# Patient Record
Sex: MALE | Race: WHITE | NOT HISPANIC OR LATINO | Employment: OTHER | URBAN - METROPOLITAN AREA
[De-identification: names, ages, dates, MRNs, and addresses within clinical notes are randomized per-mention and may not be internally consistent; named-entity substitution may affect disease eponyms.]

---

## 2023-02-13 PROBLEM — I35.1 MILD AORTIC INSUFFICIENCY: Status: ACTIVE | Noted: 2023-02-13

## 2023-02-13 PROBLEM — M54.50 RIGHT LOW BACK PAIN: Status: ACTIVE | Noted: 2023-02-13

## 2023-02-13 PROBLEM — R60.0 BILATERAL LEG EDEMA: Status: ACTIVE | Noted: 2023-02-13

## 2023-02-13 PROBLEM — R31.9 HEMATURIA: Status: RESOLVED | Noted: 2023-02-13 | Resolved: 2023-02-13

## 2023-02-13 PROBLEM — M19.90 OSTEOARTHRITIS, LOCALIZED: Status: ACTIVE | Noted: 2023-02-13

## 2023-02-13 PROBLEM — M15.9 GENERALIZED OSTEOARTHRITIS OF MULTIPLE SITES: Status: ACTIVE | Noted: 2023-02-13

## 2023-02-13 PROBLEM — L53.9 LEG ERYTHEMA: Status: ACTIVE | Noted: 2023-02-13

## 2023-02-13 PROBLEM — I12.0 BENIGN HYPERTENSIVE KIDNEY DISEASE, STAGE V (MULTI): Status: ACTIVE | Noted: 2023-02-13

## 2023-02-13 PROBLEM — N40.0 BENIGN ENLARGEMENT OF PROSTATE: Status: ACTIVE | Noted: 2023-02-13

## 2023-02-13 PROBLEM — L55.1 SECOND DEGREE SUNBURN: Status: RESOLVED | Noted: 2023-02-13 | Resolved: 2023-02-13

## 2023-02-13 PROBLEM — R60.9 EDEMA: Status: ACTIVE | Noted: 2023-02-13

## 2023-02-13 PROBLEM — M79.652 LEFT THIGH PAIN: Status: RESOLVED | Noted: 2023-02-13 | Resolved: 2023-02-13

## 2023-02-13 PROBLEM — L08.9 INFECTED SEBACEOUS CYST: Status: ACTIVE | Noted: 2023-02-13

## 2023-02-13 PROBLEM — R21 RASH: Status: RESOLVED | Noted: 2023-02-13 | Resolved: 2023-02-13

## 2023-02-13 PROBLEM — C61 ADENOCARCINOMA OF PROSTATE (MULTI): Status: ACTIVE | Noted: 2023-02-13

## 2023-02-13 PROBLEM — R07.81 RIB PAIN ON LEFT SIDE: Status: RESOLVED | Noted: 2023-02-13 | Resolved: 2023-02-13

## 2023-02-13 PROBLEM — I42.2 ASYMMETRIC SEPTAL HYPERTROPHY (MULTI): Status: ACTIVE | Noted: 2023-02-13

## 2023-02-13 PROBLEM — E55.9 VITAMIN D DEFICIENCY: Status: ACTIVE | Noted: 2023-02-13

## 2023-02-13 PROBLEM — R05.8 COUGH SECONDARY TO ANGIOTENSIN CONVERTING ENZYME INHIBITOR (ACE-I): Status: ACTIVE | Noted: 2023-02-13

## 2023-02-13 PROBLEM — L72.3 INFECTED SEBACEOUS CYST: Status: ACTIVE | Noted: 2023-02-13

## 2023-02-13 PROBLEM — E87.6 HYPOKALEMIA: Status: ACTIVE | Noted: 2023-02-13

## 2023-02-13 PROBLEM — R05.9 COUGH: Status: RESOLVED | Noted: 2023-02-13 | Resolved: 2023-02-13

## 2023-02-13 PROBLEM — K21.9 ESOPHAGEAL REFLUX: Status: ACTIVE | Noted: 2023-02-13

## 2023-02-13 PROBLEM — T46.4X5A COUGH SECONDARY TO ANGIOTENSIN CONVERTING ENZYME INHIBITOR (ACE-I): Status: ACTIVE | Noted: 2023-02-13

## 2023-02-13 PROBLEM — M54.2 NECK PAIN: Status: RESOLVED | Noted: 2023-02-13 | Resolved: 2023-02-13

## 2023-02-13 PROBLEM — L30.9 ECZEMA: Status: ACTIVE | Noted: 2023-02-13

## 2023-02-13 PROBLEM — I48.91 ATRIAL FIBRILLATION (MULTI): Status: ACTIVE | Noted: 2023-02-13

## 2023-02-13 PROBLEM — R29.898 BILATERAL LEG WEAKNESS: Status: ACTIVE | Noted: 2023-02-13

## 2023-02-13 PROBLEM — M25.551 RIGHT HIP PAIN: Status: RESOLVED | Noted: 2023-02-13 | Resolved: 2023-02-13

## 2023-02-13 PROBLEM — R29.898 TRANSIENT RIGHT LEG WEAKNESS: Status: ACTIVE | Noted: 2023-02-13

## 2023-02-13 PROBLEM — J20.9 ACUTE BRONCHITIS: Status: RESOLVED | Noted: 2023-02-13 | Resolved: 2023-02-13

## 2023-02-13 PROBLEM — E53.8 VITAMIN B 12 DEFICIENCY: Status: ACTIVE | Noted: 2023-02-13

## 2023-02-13 PROBLEM — N18.5 BENIGN HYPERTENSIVE KIDNEY DISEASE, STAGE V (MULTI): Status: ACTIVE | Noted: 2023-02-13

## 2023-02-13 PROBLEM — K44.9 HIATAL HERNIA: Status: ACTIVE | Noted: 2023-02-13

## 2023-02-13 PROBLEM — I51.7 ASYMMETRIC SEPTAL HYPERTROPHY: Status: ACTIVE | Noted: 2023-02-13

## 2023-02-13 PROBLEM — M96.1 FAILED BACK SYNDROME OF LUMBAR SPINE: Status: ACTIVE | Noted: 2023-02-13

## 2023-02-13 PROBLEM — I10 HYPERTENSION: Status: ACTIVE | Noted: 2023-02-13

## 2023-02-13 PROBLEM — R91.1 LUNG NODULE: Status: ACTIVE | Noted: 2023-02-13

## 2023-02-13 PROBLEM — R13.10 DYSPHAGIA: Status: ACTIVE | Noted: 2023-02-13

## 2023-02-13 PROBLEM — T24.019A BURN OF THIGH: Status: RESOLVED | Noted: 2023-02-13 | Resolved: 2023-02-13

## 2023-02-13 PROBLEM — E78.00 HYPERCHOLESTEROLEMIA: Status: ACTIVE | Noted: 2023-02-13

## 2023-02-13 RX ORDER — FUROSEMIDE 20 MG/1
30 TABLET ORAL DAILY
COMMUNITY
Start: 2021-03-17 | End: 2023-06-05 | Stop reason: DRUGHIGH

## 2023-02-13 RX ORDER — METOPROLOL SUCCINATE 25 MG/1
1 TABLET, EXTENDED RELEASE ORAL DAILY
COMMUNITY
Start: 2022-03-28 | End: 2023-11-03 | Stop reason: SDUPTHER

## 2023-02-13 RX ORDER — SIMVASTATIN 40 MG/1
1 TABLET, FILM COATED ORAL NIGHTLY
COMMUNITY
Start: 2012-07-06 | End: 2023-03-16 | Stop reason: SINTOL

## 2023-02-13 RX ORDER — DOXAZOSIN 2 MG/1
2 TABLET ORAL NIGHTLY
COMMUNITY
Start: 2021-07-25 | End: 2023-06-05 | Stop reason: ALTCHOICE

## 2023-02-13 RX ORDER — TAMSULOSIN HYDROCHLORIDE 0.4 MG/1
1 CAPSULE ORAL DAILY
COMMUNITY
Start: 2016-07-06 | End: 2023-06-05 | Stop reason: ALTCHOICE

## 2023-02-13 RX ORDER — NAPROXEN SODIUM 220 MG/1
1200 TABLET ORAL DAILY
COMMUNITY
Start: 2019-02-14

## 2023-03-16 ENCOUNTER — LAB (OUTPATIENT)
Dept: LAB | Facility: LAB | Age: 83
End: 2023-03-16
Payer: MEDICARE

## 2023-03-16 ENCOUNTER — OFFICE VISIT (OUTPATIENT)
Dept: PRIMARY CARE | Facility: CLINIC | Age: 83
End: 2023-03-16
Payer: MEDICARE

## 2023-03-16 VITALS
SYSTOLIC BLOOD PRESSURE: 92 MMHG | HEART RATE: 87 BPM | RESPIRATION RATE: 20 BRPM | DIASTOLIC BLOOD PRESSURE: 54 MMHG | TEMPERATURE: 98 F | OXYGEN SATURATION: 98 %

## 2023-03-16 DIAGNOSIS — R60.0 EDEMA OF RIGHT LOWER EXTREMITY: ICD-10-CM

## 2023-03-16 DIAGNOSIS — S81.802A WOUND OF LEFT LOWER EXTREMITY, INITIAL ENCOUNTER: ICD-10-CM

## 2023-03-16 DIAGNOSIS — E78.00 HYPERCHOLESTEROLEMIA: ICD-10-CM

## 2023-03-16 DIAGNOSIS — E87.6 HYPOKALEMIA: ICD-10-CM

## 2023-03-16 DIAGNOSIS — R29.898 BILATERAL LEG WEAKNESS: ICD-10-CM

## 2023-03-16 DIAGNOSIS — M96.1 FAILED BACK SYNDROME OF LUMBAR SPINE: ICD-10-CM

## 2023-03-16 DIAGNOSIS — R29.898 TRANSIENT RIGHT LEG WEAKNESS: ICD-10-CM

## 2023-03-16 DIAGNOSIS — I10 HYPERTENSION, UNSPECIFIED TYPE: ICD-10-CM

## 2023-03-16 DIAGNOSIS — I10 HYPERTENSION, UNSPECIFIED TYPE: Primary | ICD-10-CM

## 2023-03-16 LAB
ALANINE AMINOTRANSFERASE (SGPT) (U/L) IN SER/PLAS: 20 U/L (ref 10–52)
ALBUMIN (G/DL) IN SER/PLAS: 3.5 G/DL (ref 3.4–5)
ALKALINE PHOSPHATASE (U/L) IN SER/PLAS: 48 U/L (ref 33–136)
ANION GAP IN SER/PLAS: 14 MMOL/L (ref 10–20)
ASPARTATE AMINOTRANSFERASE (SGOT) (U/L) IN SER/PLAS: 28 U/L (ref 9–39)
BILIRUBIN TOTAL (MG/DL) IN SER/PLAS: 1.2 MG/DL (ref 0–1.2)
CALCIUM (MG/DL) IN SER/PLAS: 8.5 MG/DL (ref 8.6–10.3)
CARBON DIOXIDE, TOTAL (MMOL/L) IN SER/PLAS: 23 MMOL/L (ref 21–32)
CHLORIDE (MMOL/L) IN SER/PLAS: 104 MMOL/L (ref 98–107)
CREATININE (MG/DL) IN SER/PLAS: 2.11 MG/DL (ref 0.5–1.3)
ERYTHROCYTE DISTRIBUTION WIDTH (RATIO) BY AUTOMATED COUNT: 14.1 % (ref 11.5–14.5)
ERYTHROCYTE MEAN CORPUSCULAR HEMOGLOBIN CONCENTRATION (G/DL) BY AUTOMATED: 33.2 G/DL (ref 32–36)
ERYTHROCYTE MEAN CORPUSCULAR VOLUME (FL) BY AUTOMATED COUNT: 91 FL (ref 80–100)
ERYTHROCYTES (10*6/UL) IN BLOOD BY AUTOMATED COUNT: 3.16 X10E12/L (ref 4.5–5.9)
GFR MALE: 30 ML/MIN/1.73M2
GLUCOSE (MG/DL) IN SER/PLAS: 104 MG/DL (ref 74–99)
HEMATOCRIT (%) IN BLOOD BY AUTOMATED COUNT: 28.9 % (ref 41–52)
HEMOGLOBIN (G/DL) IN BLOOD: 9.6 G/DL (ref 13.5–17.5)
LEUKOCYTES (10*3/UL) IN BLOOD BY AUTOMATED COUNT: 5.5 X10E9/L (ref 4.4–11.3)
PLATELETS (10*3/UL) IN BLOOD AUTOMATED COUNT: 162 X10E9/L (ref 150–450)
POTASSIUM (MMOL/L) IN SER/PLAS: 3.4 MMOL/L (ref 3.5–5.3)
PROTEIN TOTAL: 5.8 G/DL (ref 6.4–8.2)
SODIUM (MMOL/L) IN SER/PLAS: 138 MMOL/L (ref 136–145)
UREA NITROGEN (MG/DL) IN SER/PLAS: 56 MG/DL (ref 6–23)

## 2023-03-16 PROCEDURE — 85027 COMPLETE CBC AUTOMATED: CPT

## 2023-03-16 PROCEDURE — 80053 COMPREHEN METABOLIC PANEL: CPT

## 2023-03-16 PROCEDURE — 99215 OFFICE O/P EST HI 40 MIN: CPT | Performed by: FAMILY MEDICINE

## 2023-03-16 PROCEDURE — 36415 COLL VENOUS BLD VENIPUNCTURE: CPT

## 2023-03-16 RX ORDER — TRAMADOL HYDROCHLORIDE 50 MG/1
50 TABLET ORAL EVERY 8 HOURS PRN
Qty: 18 TABLET | Refills: 0 | Status: SHIPPED | OUTPATIENT
Start: 2023-03-16 | End: 2023-03-22

## 2023-03-16 NOTE — PROGRESS NOTES
Subjective   Patient ID: Jero Aguilar is a 82 y.o. male who presents for Leg Problem (Constant pain in the right thigh. Swelling has improved but pain is becoming more severe. The extreme pain come at night. Cannot lift it by himself. Is dragging it when walking. s).    HPI   Patient presents today with his wife for follow-up on the swelling of the right lower extremity.  He has been using the furosemide on an as-needed basis now and he usually does not use it on days when he is leaving the house.  His wife also noticed an ulcer on the lateral side of his left lower extremity.  It is a shallow ulcer and approximately a centimeter in diameter.  The left leg is not as swollen as the right.  Patient is also complaining of significant discomfort in the right thigh and significant loss of function of the right lower extremity.  He cannot lift it on his own.  Review of Systems   All other systems reviewed and are negative.      Objective   BP 92/54   Pulse 87   Temp 36.7 °C (98 °F)   Resp 20   SpO2 98%     Physical Exam  Constitutional:       Appearance: He is well-developed.   HENT:      Head: Normocephalic and atraumatic.      Right Ear: Tympanic membrane, ear canal and external ear normal.      Left Ear: Tympanic membrane, ear canal and external ear normal.      Ears:      Comments: Patient has bilateral hearing loss and wears hearing aids     Nose: Nose normal.      Mouth/Throat:      Mouth: Mucous membranes are moist.      Pharynx: Oropharynx is clear.   Eyes:      Extraocular Movements: Extraocular movements intact.      Conjunctiva/sclera: Conjunctivae normal.      Pupils: Pupils are equal, round, and reactive to light.   Cardiovascular:      Rate and Rhythm: Normal rate and regular rhythm.      Heart sounds: Normal heart sounds. No murmur heard.  Pulmonary:      Effort: Pulmonary effort is normal.      Breath sounds: Normal breath sounds. No wheezing.   Abdominal:      General: Abdomen is flat. Bowel sounds  are normal.      Palpations: Abdomen is soft.   Musculoskeletal:         General: Swelling (2-3+ pitting edema of the right lower extremity) present. Normal range of motion.   Skin:     General: Skin is warm and dry.      Findings: Lesion (1 cm in diameter shallow ulcer on the lateral left lower extremity) present.   Neurological:      General: No focal deficit present.      Mental Status: He is alert and oriented to person, place, and time. Mental status is at baseline.      Motor: Weakness (Patient with significant motor weakness of the right lower extremity) present.   Psychiatric:         Mood and Affect: Mood normal.         Behavior: Behavior normal.         Thought Content: Thought content normal.         Judgment: Judgment normal.         Assessment/Plan   Problem List Items Addressed This Visit       Hypertension - Primary    Relevant Orders    CBC (Completed)    Comprehensive Metabolic Panel (Completed)    Failed back syndrome of lumbar spine    Relevant Medications    traMADol (Ultram) 50 mg tablet    Hypercholesterolemia    Hypokalemia    Relevant Orders    Comprehensive Metabolic Panel (Completed)    Bilateral leg weakness    Relevant Orders    CBC (Completed)    Transient right leg weakness     Other Visit Diagnoses       Edema of right lower extremity        Relevant Orders    Vascular US lower extremity venous duplex right (Completed)    Wound of left lower extremity, initial encounter        Relevant Orders    Referral to Wound Clinic        Referral to wound clinic for the lesion on the lateral left lower extremity.  Will contact patient with lab results when available.  Because of the pain he is having in the right lower extremity today will do a venous duplex scan of the right lower extremity to rule out a clot.  He is already taking Xarelto 15 mg daily.  Medication list reconciled.  Thank you for visiting today!      Professional services: Some of this note was completed using Dragon voice  technology and sometimes the software misinterprets words. This may include unintended errors with respect to translation of words, typographical errors or grammar errors which may not have been identified prior to finalization of the chart note. Please take this into account when reading the note. Thank you.

## 2023-03-28 ENCOUNTER — TELEPHONE (OUTPATIENT)
Dept: PRIMARY CARE | Facility: CLINIC | Age: 83
End: 2023-03-28
Payer: MEDICARE

## 2023-03-28 NOTE — TELEPHONE ENCOUNTER
Mena from [facility] calling for verbal orders to initiate  home care for PT and OT. Please advise.

## 2023-04-03 ENCOUNTER — APPOINTMENT (OUTPATIENT)
Dept: PRIMARY CARE | Facility: CLINIC | Age: 83
End: 2023-04-03
Payer: MEDICARE

## 2023-04-03 ENCOUNTER — OFFICE VISIT (OUTPATIENT)
Dept: PRIMARY CARE | Facility: CLINIC | Age: 83
End: 2023-04-03
Payer: MEDICARE

## 2023-04-03 VITALS
HEART RATE: 82 BPM | BODY MASS INDEX: 24.18 KG/M2 | DIASTOLIC BLOOD PRESSURE: 56 MMHG | WEIGHT: 159 LBS | RESPIRATION RATE: 18 BRPM | SYSTOLIC BLOOD PRESSURE: 89 MMHG | OXYGEN SATURATION: 98 % | TEMPERATURE: 98.4 F

## 2023-04-03 DIAGNOSIS — R60.0 BILATERAL LEG EDEMA: ICD-10-CM

## 2023-04-03 DIAGNOSIS — R29.898 BILATERAL LEG WEAKNESS: ICD-10-CM

## 2023-04-03 DIAGNOSIS — F03.90 DEMENTIA, UNSPECIFIED DEMENTIA SEVERITY, UNSPECIFIED DEMENTIA TYPE, UNSPECIFIED WHETHER BEHAVIORAL, PSYCHOTIC, OR MOOD DISTURBANCE OR ANXIETY (MULTI): ICD-10-CM

## 2023-04-03 DIAGNOSIS — K29.80 DUODENITIS: Primary | ICD-10-CM

## 2023-04-03 DIAGNOSIS — I10 HYPERTENSION, UNSPECIFIED TYPE: ICD-10-CM

## 2023-04-03 DIAGNOSIS — K55.21 AVM (ARTERIOVENOUS MALFORMATION) OF COLON WITH HEMORRHAGE: ICD-10-CM

## 2023-04-03 DIAGNOSIS — I48.11 LONGSTANDING PERSISTENT ATRIAL FIBRILLATION (MULTI): ICD-10-CM

## 2023-04-03 PROCEDURE — 99495 TRANSJ CARE MGMT MOD F2F 14D: CPT | Performed by: FAMILY MEDICINE

## 2023-04-03 NOTE — PROGRESS NOTES
Subjective   Patient ID: Jero Aguilar is a 82 y.o. male who presents for Hospital Follow-up (For low hemoglobin. ).    HPI   Patient comes in today with his wife for follow-up on recent hospitalization.  Patient was seen here on 3/16/2023 and lab work was done and he was found to be quite anemic.  He was notified of the anemia and recommended he go to the ER for further evaluation and work-up.  He was seen at [facility] and his FOBT was positive in the ED.  Patient was admitted and an EGD and colonoscopy were done.  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  His hemoglobin was stable he did receive 1 dose of Venofer.  His Xarelto was discontinued in the hospital and he was instructed to restart it again for a week or 2.  He has an upcoming appointment with his cardiologist in 2 weeks.  He has chronic atrial fib.  Upon discharge from the hospital he was sent to a nursing home where he stayed for a couple of days before his wife brought him home AMA.  She states that since he has been home he has not complained about leg or back pain.  Also the swelling in his legs has significantly decreased.    3/16/2023  Patient presents today with his wife for follow-up on the swelling of the right lower extremity.  He has been using the furosemide on an as-needed basis now and he usually does not use it on days when he is leaving the house.  His wife also noticed an ulcer on the lateral side of his left lower extremity.  It is a shallow ulcer and approximately a centimeter in diameter.  The left leg is not as swollen as the right.  Patient is also complaining of significant discomfort in the right thigh and significant loss of function of the right lower extremity.  He cannot lift it on his own.    Review of Systems   All other systems reviewed and are negative.      Objective   BP 89/56   Pulse 82   Temp 36.9 °C (98.4 °F)   Resp 18   Wt 72.1 kg  (159 lb)   SpO2 98%   BMI 24.18 kg/m²     Physical Exam  Vitals reviewed.   Constitutional:       Appearance: He is well-developed.      Comments: Patient is wheelchair-bound   HENT:      Head: Normocephalic and atraumatic.      Right Ear: Tympanic membrane, ear canal and external ear normal.      Left Ear: Tympanic membrane, ear canal and external ear normal.      Nose: Nose normal.      Mouth/Throat:      Mouth: Mucous membranes are moist.      Pharynx: Oropharynx is clear.   Eyes:      Extraocular Movements: Extraocular movements intact.      Conjunctiva/sclera: Conjunctivae normal.      Pupils: Pupils are equal, round, and reactive to light.   Cardiovascular:      Rate and Rhythm: Normal rate and regular rhythm.      Heart sounds: Normal heart sounds. No murmur heard.  Pulmonary:      Effort: Pulmonary effort is normal.      Breath sounds: Normal breath sounds. No wheezing.   Abdominal:      General: Abdomen is flat. Bowel sounds are normal.      Palpations: Abdomen is soft.   Musculoskeletal:         General: Normal range of motion.      Comments: Patient is using wheelchair at this time   Skin:     General: Skin is warm and dry.   Neurological:      General: No focal deficit present.      Mental Status: He is alert and oriented to person, place, and time. Mental status is at baseline.   Psychiatric:         Mood and Affect: Mood normal.         Behavior: Behavior normal.      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  RTC in one month for recheck, sooner should problems arise.  Return to clinic in the near future for complete physical exam and fasting labs.  Medication list reconciled.  Thank you for  visiting today!      Professional services: Some of this note was completed using Dragon voice technology and sometimes the software misinterprets words. This may include unintended errors with respect to translation of words, typographical errors or grammar errors which may not have been identified prior to finalization of the chart note. Please take this into account when reading the note. Thank you.

## 2023-04-05 PROBLEM — F03.90 DEMENTIA (MULTI): Status: ACTIVE | Noted: 2023-04-05

## 2023-04-05 PROBLEM — K29.80 DUODENITIS: Status: ACTIVE | Noted: 2023-04-05

## 2023-04-05 PROBLEM — K55.21 AVM (ARTERIOVENOUS MALFORMATION) OF COLON WITH HEMORRHAGE: Status: ACTIVE | Noted: 2023-04-05

## 2023-04-17 ENCOUNTER — TELEPHONE (OUTPATIENT)
Dept: PRIMARY CARE | Facility: CLINIC | Age: 83
End: 2023-04-17
Payer: MEDICARE

## 2023-04-17 DIAGNOSIS — K21.9 GASTROESOPHAGEAL REFLUX DISEASE WITHOUT ESOPHAGITIS: Primary | ICD-10-CM

## 2023-04-17 RX ORDER — PANTOPRAZOLE SODIUM 40 MG/1
40 TABLET, DELAYED RELEASE ORAL DAILY
Qty: 30 TABLET | Refills: 1 | Status: SHIPPED | OUTPATIENT
Start: 2023-04-17 | End: 2023-06-21 | Stop reason: SDUPTHER

## 2023-04-17 NOTE — TELEPHONE ENCOUNTER
Patient's wife is calling because when he was in the hospital a couple weeks ago he was put on Pantoproazole Sodium 40 mg she is wondering does he still need to take this medication and if so can you call in a refill to the [facility] in [place].  Patient can be reached at [phone].    Thank You

## 2023-06-02 DIAGNOSIS — E78.00 HYPERCHOLESTEROLEMIA: Primary | ICD-10-CM

## 2023-06-02 RX ORDER — SIMVASTATIN 40 MG/1
TABLET, FILM COATED ORAL
Qty: 90 TABLET | Refills: 0 | Status: SHIPPED | OUTPATIENT
Start: 2023-06-02 | End: 2023-06-05 | Stop reason: ALTCHOICE

## 2023-06-02 NOTE — TELEPHONE ENCOUNTER
Requested Prescriptions     Pending Prescriptions Disp Refills    simvastatin (Zocor) 40 mg tablet [Pharmacy Med Name: Simvastatin Oral Tablet 40 MG] 90 tablet 0     Sig: TAKE ONE TABLET BY MOUTH EVERY EVENING

## 2023-06-05 ENCOUNTER — OFFICE VISIT (OUTPATIENT)
Dept: PRIMARY CARE | Facility: CLINIC | Age: 83
End: 2023-06-05
Payer: MEDICARE

## 2023-06-05 VITALS
OXYGEN SATURATION: 96 % | BODY MASS INDEX: 22.81 KG/M2 | WEIGHT: 150 LBS | RESPIRATION RATE: 18 BRPM | HEART RATE: 74 BPM | SYSTOLIC BLOOD PRESSURE: 124 MMHG | TEMPERATURE: 98.1 F | DIASTOLIC BLOOD PRESSURE: 68 MMHG

## 2023-06-05 DIAGNOSIS — E78.00 HYPERCHOLESTEROLEMIA: ICD-10-CM

## 2023-06-05 DIAGNOSIS — I10 HYPERTENSION, UNSPECIFIED TYPE: ICD-10-CM

## 2023-06-05 DIAGNOSIS — C61 ADENOCARCINOMA OF PROSTATE (MULTI): ICD-10-CM

## 2023-06-05 DIAGNOSIS — E53.8 VITAMIN B 12 DEFICIENCY: ICD-10-CM

## 2023-06-05 DIAGNOSIS — F02.80 ALZHEIMER'S DEMENTIA, UNSPECIFIED DEMENTIA SEVERITY, UNSPECIFIED TIMING OF DEMENTIA ONSET, UNSPECIFIED WHETHER BEHAVIORAL, PSYCHOTIC, OR MOOD DISTURBANCE OR ANXIETY (MULTI): ICD-10-CM

## 2023-06-05 DIAGNOSIS — I48.91 ATRIAL FIBRILLATION, UNSPECIFIED TYPE (MULTI): ICD-10-CM

## 2023-06-05 DIAGNOSIS — Z00.00 ROUTINE GENERAL MEDICAL EXAMINATION AT HEALTH CARE FACILITY: Primary | ICD-10-CM

## 2023-06-05 DIAGNOSIS — R29.898 TRANSIENT RIGHT LEG WEAKNESS: ICD-10-CM

## 2023-06-05 DIAGNOSIS — E55.9 VITAMIN D DEFICIENCY: ICD-10-CM

## 2023-06-05 DIAGNOSIS — I12.0: ICD-10-CM

## 2023-06-05 DIAGNOSIS — R63.4 UNEXPLAINED WEIGHT LOSS: ICD-10-CM

## 2023-06-05 DIAGNOSIS — N18.5: ICD-10-CM

## 2023-06-05 DIAGNOSIS — G30.9 ALZHEIMER'S DEMENTIA, UNSPECIFIED DEMENTIA SEVERITY, UNSPECIFIED TIMING OF DEMENTIA ONSET, UNSPECIFIED WHETHER BEHAVIORAL, PSYCHOTIC, OR MOOD DISTURBANCE OR ANXIETY (MULTI): ICD-10-CM

## 2023-06-05 DIAGNOSIS — I42.2 ASYMMETRIC SEPTAL HYPERTROPHY (MULTI): ICD-10-CM

## 2023-06-05 PROCEDURE — 3074F SYST BP LT 130 MM HG: CPT | Performed by: FAMILY MEDICINE

## 2023-06-05 PROCEDURE — G0439 PPPS, SUBSEQ VISIT: HCPCS | Performed by: FAMILY MEDICINE

## 2023-06-05 PROCEDURE — 1170F FXNL STATUS ASSESSED: CPT | Performed by: FAMILY MEDICINE

## 2023-06-05 PROCEDURE — 1159F MED LIST DOCD IN RCRD: CPT | Performed by: FAMILY MEDICINE

## 2023-06-05 PROCEDURE — 1036F TOBACCO NON-USER: CPT | Performed by: FAMILY MEDICINE

## 2023-06-05 PROCEDURE — 3078F DIAST BP <80 MM HG: CPT | Performed by: FAMILY MEDICINE

## 2023-06-05 PROCEDURE — 1160F RVW MEDS BY RX/DR IN RCRD: CPT | Performed by: FAMILY MEDICINE

## 2023-06-05 RX ORDER — FUROSEMIDE 40 MG/1
40 TABLET ORAL DAILY
Qty: 90 TABLET | Refills: 0 | Status: SHIPPED | OUTPATIENT
Start: 2023-06-05 | End: 2023-11-30 | Stop reason: ALTCHOICE

## 2023-06-05 RX ORDER — OXYBUTYNIN CHLORIDE 5 MG/1
5 TABLET ORAL 2 TIMES DAILY
COMMUNITY

## 2023-06-05 RX ORDER — VIBEGRON 75 MG/1
1 TABLET, FILM COATED ORAL DAILY
COMMUNITY
Start: 2022-12-19

## 2023-06-05 ASSESSMENT — PATIENT HEALTH QUESTIONNAIRE - PHQ9
SUM OF ALL RESPONSES TO PHQ9 QUESTIONS 1 AND 2: 0
2. FEELING DOWN, DEPRESSED OR HOPELESS: NOT AT ALL
1. LITTLE INTEREST OR PLEASURE IN DOING THINGS: NOT AT ALL

## 2023-06-05 ASSESSMENT — ENCOUNTER SYMPTOMS
LOSS OF SENSATION IN FEET: 0
OCCASIONAL FEELINGS OF UNSTEADINESS: 0
DEPRESSION: 0

## 2023-06-05 ASSESSMENT — ACTIVITIES OF DAILY LIVING (ADL)
BATHING: INDEPENDENT
TAKING_MEDICATION: INDEPENDENT
MANAGING_FINANCES: INDEPENDENT
DRESSING: INDEPENDENT
DOING_HOUSEWORK: INDEPENDENT
GROCERY_SHOPPING: INDEPENDENT

## 2023-06-05 NOTE — PROGRESS NOTES
Subjective   Reason for Visit: Jero Aguilar is an 82 y.o. male here for a Medicare Wellness visit.               HPI    Patient Self Assessment of Health Status  Patient Self Assessment: Good    Nutrition and Exercise  Current Diet: Unhealthy Diet (feels like he is not active enough to build an appetite)  Adequate Fluid Intake: Yes  Caffeine: Yes  Exercise Frequency: Infrequently         Home Safety Risk Factors: None    Patient Care Team:  Dameon Iniguez DO as PCP - General  Kevin Leon DO as PCP - [employer] PCP     Review of Systems    Objective   Vitals:  /68   Pulse 74   Temp 36.7 °C (98.1 °F)   Resp 18   Wt 68 kg (150 lb)   SpO2 96%   BMI 22.81 kg/m²       Physical Exam    Assessment/Plan   Problem List Items Addressed This Visit    None

## 2023-06-05 NOTE — PROGRESS NOTES
Subjective   Reason for Visit: Jero Aguilar is an 82 y.o. male here for a Medicare Wellness visit.               HPI  Patient comes in today with his wife Cyndi for Medicare wellness exam.  His wife reports that he has been losing weight.  He is down 9 pounds from April when he was here last and 25 pounds from January 2023.  He is on 40 mg of Lasix daily as well as having a diminished appetite.  He cannot explain why and his wife is frustrated because he only eats half of what she gives him.  She will make him a sandwich and he will eat half.  She will make him 2 pieces of toast and he will eat 1.  Patient had an EGD in March 2023 which showed gastric and antral erosions as well as duodenitis.  Patient is on Protonix 40 mg a day.    4/3/2023  Patient comes in today with his wife for follow-up on recent hospitalization.  Patient was seen here on 3/16/2023 and lab work was done and he was found to be quite anemic.  He was notified of the anemia and recommended he go to the ER for further evaluation and work-up.  He was seen at [facility] and his FOBT was positive in the ED.  Patient was admitted and an EGD and colonoscopy were done.  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  His hemoglobin was stable he did receive 1 dose of Venofer.  His Xarelto was discontinued in the hospital and he was instructed to restart it again for a week or 2.  He has an upcoming appointment with his cardiologist in 2 weeks.  He has chronic atrial fib.  Upon discharge from the hospital he was sent to a nursing home where he stayed for a couple of days before his wife brought him home AMA.  She states that since he has been home he has not complained about leg or back pain.  Also the swelling in his legs has significantly decreased.    3/16/2023  Patient presents today with his wife for follow-up on the swelling of the right lower extremity.  He has been using  the furosemide on an as-needed basis now and he usually does not use it on days when he is leaving the house.  His wife also noticed an ulcer on the lateral side of his left lower extremity.  It is a shallow ulcer and approximately a centimeter in diameter.  The left leg is not as swollen as the right.  Patient is also complaining of significant discomfort in the right thigh and significant loss of function of the right lower extremity.  He cannot lift it on his own.        3/2/2023  Patient comes in today with his wife for follow-up on multiple issues. His right lower extremity swelling has improved remarkably from this visit and he has been sleeping through the night without having to get up to urinate. He still is having trouble moving the right leg and foot. He also has noted since adding the oxybutinin his appetite/taste buds have changed and he has a very dry mouth.    He has concerns about the number of meds he is taking and is frustrated that he is disabled and not able to do the things that he was able to do before.     2/16/2023  Patient comes in today with his wife for follow-up from last month. He is complaining of a dry mouth. He claims he is also unable to taste x a month. He has had a loss of appetite. Last month when he went to see his urologist he was put on oxybutynin. So far he and his wife have not noticed much of a difference.    His legs are still swollen especially the right lower extremity. He does not have the redness there any longer however. He is still extremely weak and is in a wheelchair.    1/12/2023  Patient returns today with his wife for recheck of the swelling of his left lower extremity. He continues to have problems. He went back to the ER on 12/28/2022 after his left leg seemed to get worse and he was placed on clindamycin 450 mg 3 times a day for 7 days. His wife feels that his leg has improved since that time with decrease in the swelling and redness but there is still redness  around the knee and some slight increased warmth there along with 2-3+ pitting edema of the left lower extremity. Wife feels the improvement has been about 50%.    Patient also having problems with frequent urination, urgency and urinary incontinence. He is currently on Gemtesa 75 mg and furosemide 60 mg daily. The patient and his wife do not want to increase the water pill if they can avoid it.    12/19/2022  Patient comes in today with his wife for recheck of the swelling of his left lower extremity. This occurred initially on 12/9/2022 and he was sent to the ER at [facility] where he after evaluation he was determined to have cellulitis of his left lower extremity. He was placed on antibiotics which he is still on but his wife became concerned because he continues to have edema of his left lower extremity. In review of the ER notes vascular study was performed on the left lower extremity and was negative for clot. At this time there is pitting edema of the left lower extremity but no erythema or pain.    Unrelated to the leg swelling his wife has noticed a decrease in his mental faculties with memory loss and forgetfulness.    6/24/2022  Patient comes in today with his wife for reevaluation of his right leg. Since he was here in April he has been going to physical therapy and he is currently walking with a walker but has significant weakness of his right thigh. The physical therapy does not seem to be helping. Patient had fusion of 2-3 vertebrae in the lumbar region back in April 2012 by Dr. Perez at [facility] but he is no longer around. He does have hardware in his back and because of this we will need to find out if he can have an MRI or if he needs a CT scan.    4/18/20222  Patient comes in today with his wife for Medicare wellness exam but also with complaints of difficulty walking. He is unable to walk without a cane or a walker due to discomfort in his right thigh. He has been going on for  a few months. He thinks it somehow may be related to his back surgery and April 2012. He claims that his right leg drags and he has to physically lift his right leg to do things such as put on his socks and shoes.    10/5/2021  Patient comes in today for Medicare wellness exam. He is currently without complaints. He follows on a regular basis with the urologist and oncologist for his prostate cancer history.    4/9/2021  Patient comes in today with his wife for evaluation of a tender lump in his left lower back. He noticed it a few days ago and had his wife look at it and she recommended he come in for further evaluation. It is tender to touch and inflamed in approximately 1-1/2 cm in diameter. It appears to be an infected sebaceous cyst.    3/17/2021  Patient comes in today with his wife for 6-month checkup and refill of medication. He is currently without complaints. He is not due for lab work until his physical in September. He and his wife have both gotten the Covid vaccines.    9/30/2020 patient comes in today with his wife for checkup and refill medication. He is also here for Medicare wellness exam. He denies any current complaints.    10/7/19  Patient comes in today for a Medicare wellness exam. He is currently without complaints. Since he was here last he was in to see Dr. Wallace about this rash and was given a sample to put on his legs 10 days off 10 days on and it has cleared up the rash. He doesn't know what the name of the medicine is off hand, he will call back with the name.    8/21/19  Patient comes back in today with his wife for follow-up on his rash. The rash continues to be present and so far it has thwarted all efforts to get rid of it. It is only present on both extremities up to the midcalf region. The steroids have lessened but not gotten rid of it. Will refer patient to dermatology for further evaluation and possible biopsy of the lesions.    Patient also would like a refill of his potassium  "but would like capsules instead of tablets.    8/12/19  Patient comes in today with his wife for checkup and follow-up on his medication. He has also developed a rash on his lower extremities bilaterally on the medial pretibial region. He has been out gardening and pulling weeds. There is currently no edema in either lower extremity. He denies any new soaps, detergents or other exposure.    7/9/2019   The patient is a 78 year old male who presents for a Recheck of Follow up. The patient is here to discuss a single health problem: bw (Pt is here to discuss bw done with another doctor.). Note for \"Follow up\": Patient comes in today with his wife for follow-up on his low potassium. His last potassium was 3.2. He has been on potassium replacement.    Patient is also looking for a \"memory loss pill\". He has been having problems remembering things of late. The visiting nurse to come out to his house from the insurance company and one thing he couldn't do was remember 3 objects that she named. The rest of things he could do.      Last visit:  Patient comes in today to discuss labs done by his hematologist/oncologist . Patient had lab work done on 6/11/19 and his potassium was found to be low at 3.2 and he was instructed to follow-up here. Dr. Landaverde is following the patient for his elevated PSA of 8.7. He also has a slightly elevated creatinine of 1.7. We discussed his labs today and I recommended potassium replacement and rechecking the BMP in 2 weeks. Patient also needs refills of several of his medications.     Patient Care Team:  Dameon Iniguez DO as PCP - General  Kevin Leon DO as PCP - [employer] PCP     Review of Systems   All other systems reviewed and are negative.      Objective   Vitals:  /68   Pulse 74   Temp 36.7 °C (98.1 °F)   Resp 18   Wt 68 kg (150 lb)   SpO2 96%   BMI 22.81 kg/m²       Physical Exam  Vitals reviewed.   Constitutional:       Appearance: He is " well-developed.   HENT:      Head: Normocephalic and atraumatic.      Right Ear: Tympanic membrane, ear canal and external ear normal.      Left Ear: Tympanic membrane, ear canal and external ear normal.      Nose: Nose normal.      Mouth/Throat:      Mouth: Mucous membranes are moist.      Pharynx: Oropharynx is clear.   Eyes:      Extraocular Movements: Extraocular movements intact.      Conjunctiva/sclera: Conjunctivae normal.      Pupils: Pupils are equal, round, and reactive to light.   Cardiovascular:      Rate and Rhythm: Normal rate and regular rhythm.      Heart sounds: Normal heart sounds. No murmur heard.  Pulmonary:      Effort: Pulmonary effort is normal.      Breath sounds: Normal breath sounds. No wheezing.   Abdominal:      General: Abdomen is flat. Bowel sounds are normal.      Palpations: Abdomen is soft.   Musculoskeletal:         General: Normal range of motion.   Skin:     General: Skin is warm and dry.   Neurological:      General: No focal deficit present.      Mental Status: He is alert and oriented to person, place, and time. Mental status is at baseline.      Motor: Weakness (Patient with weakness of the right lower extremity, uses a walker) present.   Psychiatric:         Mood and Affect: Mood normal.         Behavior: Behavior normal.         Thought Content: Thought content normal.         Judgment: Judgment normal.         Assessment/Plan   Problem List Items Addressed This Visit       Adenocarcinoma of prostate (CMS/HCC)    Asymmetric septal hypertrophy (CMS/HCC)    Atrial fibrillation (CMS/HCC)    Benign hypertensive kidney disease, stage V (CMS/HCC)    Hypertension    Relevant Orders    Comprehensive Metabolic Panel    Hypercholesterolemia    Relevant Orders    Lipid Panel    Transient right leg weakness    Relevant Orders    Referral to Physical Therapy    Vitamin B 12 deficiency    Relevant Orders    CBC    Vitamin B12    Vitamin D deficiency    Relevant Orders    Vitamin D, Total     Dementia (CMS/HCC)    Unexplained weight loss     Other Visit Diagnoses       Routine general medical examination at health care facility    -  Primary        Patient to do physical therapy for 6 visits.  Use medications as directed.  Patient to return to clinic after the physical therapy is completed or sooner if condition worsens.  If patient and therapist both agree that additional therapy after the 6 visits would be helpful then therapist is to contact me for additional number of visits.  Will contact patient with lab results when available.  Medication list reconciled.  Thank you for visiting today!      Professional services: Some of this note was completed using Dragon voice technology and sometimes the software misinterprets words. This may include unintended errors with respect to translation of words, typographical errors or grammar errors which may not have been identified prior to finalization of the chart note. Please take this into account when reading the note. Thank you.

## 2023-06-05 NOTE — PROGRESS NOTES
Subjective   Reason for Visit: Jero Aguilar is an 82 y.o. male here for a Medicare Wellness visit.               HPI    Patient Care Team:  Dameon Iniguez DO as PCP - General  Kevin Leon DO as PCP - [employer] PCP     Review of Systems    Objective   Vitals:  /68   Pulse 74   Temp 36.7 °C (98.1 °F)   Resp 18   Wt 68 kg (150 lb)   SpO2 96%   BMI 22.81 kg/m²       Physical Exam    Assessment/Plan   Problem List Items Addressed This Visit    None

## 2023-06-06 PROBLEM — R63.4 UNEXPLAINED WEIGHT LOSS: Status: ACTIVE | Noted: 2023-06-06

## 2023-06-06 ASSESSMENT — PATIENT HEALTH QUESTIONNAIRE - PHQ9
6. FEELING BAD ABOUT YOURSELF - OR THAT YOU ARE A FAILURE OR HAVE LET YOURSELF OR YOUR FAMILY DOWN: SEVERAL DAYS
2. FEELING DOWN, DEPRESSED OR HOPELESS: SEVERAL DAYS
5. POOR APPETITE OR OVEREATING: NEARLY EVERY DAY
3. TROUBLE FALLING OR STAYING ASLEEP OR SLEEPING TOO MUCH: SEVERAL DAYS
10. IF YOU CHECKED OFF ANY PROBLEMS, HOW DIFFICULT HAVE THESE PROBLEMS MADE IT FOR YOU TO DO YOUR WORK, TAKE CARE OF THINGS AT HOME, OR GET ALONG WITH OTHER PEOPLE: SOMEWHAT DIFFICULT
7. TROUBLE CONCENTRATING ON THINGS, SUCH AS READING THE NEWSPAPER OR WATCHING TELEVISION: SEVERAL DAYS
8. MOVING OR SPEAKING SO SLOWLY THAT OTHER PEOPLE COULD HAVE NOTICED. OR THE OPPOSITE, BEING SO FIGETY OR RESTLESS THAT YOU HAVE BEEN MOVING AROUND A LOT MORE THAN USUAL: MORE THAN HALF THE DAYS
9. THOUGHTS THAT YOU WOULD BE BETTER OFF DEAD, OR OF HURTING YOURSELF: NOT AT ALL
1. LITTLE INTEREST OR PLEASURE IN DOING THINGS: MORE THAN HALF THE DAYS
SUM OF ALL RESPONSES TO PHQ9 QUESTIONS 1 AND 2: 3
4. FEELING TIRED OR HAVING LITTLE ENERGY: MORE THAN HALF THE DAYS
SUM OF ALL RESPONSES TO PHQ QUESTIONS 1-9: 13

## 2023-06-07 LAB
NON-UH HIE A/G RATIO: 0.8
NON-UH HIE ALB: 3.1 G/DL (ref 3.4–5)
NON-UH HIE ALK PHOS: 65 UNIT/L (ref 46–116)
NON-UH HIE BILIRUBIN, TOTAL: 0.8 MG/DL (ref 0.2–1)
NON-UH HIE BUN/CREAT RATIO: 18.9
NON-UH HIE BUN: 66 MG/DL (ref 9–23)
NON-UH HIE CALCIUM: 9.2 MG/DL (ref 8.7–10.4)
NON-UH HIE CALCULATED LDL CHOLESTEROL: 138 MG/DL (ref 60–130)
NON-UH HIE CALCULATED OSMOLALITY: 305 MOSM/KG (ref 275–295)
NON-UH HIE CHLORIDE: 107 MMOL/L (ref 98–107)
NON-UH HIE CHOLESTEROL: 206 MG/DL (ref 100–200)
NON-UH HIE CO2, VENOUS: 26 MMOL/L (ref 20–31)
NON-UH HIE CREATININE: 3.5 MG/DL (ref 0.6–1.1)
NON-UH HIE DXH ACTIONS: ABNORMAL
NON-UH HIE GFR AA: 20
NON-UH HIE GLOBULIN: 3.8 G/DL
NON-UH HIE GLOMERULAR FILTRATION RATE: 17 ML/MIN/1.73M?
NON-UH HIE GLUCOSE: 105 MG/DL (ref 74–106)
NON-UH HIE GOT: 26 UNIT/L (ref 15–37)
NON-UH HIE GPT: 25 UNIT/L (ref 10–49)
NON-UH HIE HCT: 33.8 % (ref 41–52)
NON-UH HIE HDL CHOLESTEROL: 42 MG/DL (ref 40–60)
NON-UH HIE HGB: 11.6 G/DL (ref 13.5–17.5)
NON-UH HIE INSTR WBC ND: 6.6
NON-UH HIE K: 3.4 MMOL/L (ref 3.5–5.1)
NON-UH HIE MCH: 28.9 PG (ref 27–34)
NON-UH HIE MCHC: 34.5 G/DL (ref 32–37)
NON-UH HIE MCV: 83.9 FL (ref 80–100)
NON-UH HIE MPV: 7.5 FL (ref 7.4–10.4)
NON-UH HIE NA: 143 MMOL/L (ref 135–145)
NON-UH HIE PLATELET: 182 X10 (ref 150–450)
NON-UH HIE RBC: 4.03 X10 (ref 4.7–6.1)
NON-UH HIE RDW: 18.4 % (ref 11.5–14.5)
NON-UH HIE TOTAL CHOL/HDL CHOL RATIO: 4.9
NON-UH HIE TOTAL PROTEIN: 6.9 G/DL (ref 5.7–8.2)
NON-UH HIE TRIGLYCERIDES: 131 MG/DL (ref 30–150)
NON-UH HIE VIT D 25: 58 NG/ML
NON-UH HIE VITAMIN B12: 1011 PG/ML (ref 211–911)
NON-UH HIE WBC: 6.6 X10 (ref 4.5–11)

## 2023-06-21 DIAGNOSIS — K21.9 GASTROESOPHAGEAL REFLUX DISEASE WITHOUT ESOPHAGITIS: ICD-10-CM

## 2023-06-21 RX ORDER — SIMVASTATIN 40 MG/1
TABLET, FILM COATED ORAL EVERY 24 HOURS
COMMUNITY
Start: 2022-12-28 | End: 2023-11-29 | Stop reason: SDUPTHER

## 2023-06-21 NOTE — TELEPHONE ENCOUNTER
Patient's wife Cyndi is calling to request a refill on his     Pantoprazole 40 mg    Sent to [facility] in [place]    Cyndi is also asking if Jero should still be taking the Simvastatin since he was prescribed the Pantoprazole?  Cyndi is also asking to go over the results of the patient's blood work that was done at the last visit.  Cyndi can be reached at [phone].    Thank You

## 2023-06-21 NOTE — TELEPHONE ENCOUNTER
Caregiver requests prescription below    Last Office Visit: 6/5/2023     Requested Prescriptions     Pending Prescriptions Disp Refills    pantoprazole (ProtoNix) 40 mg EC tablet 30 tablet 1     Sig: Take 1 tablet (40 mg) by mouth once daily. Do not crush, chew, or split.     Please review patients wife question in Initial note.

## 2023-06-22 DIAGNOSIS — E87.6 HYPOKALEMIA: Primary | ICD-10-CM

## 2023-06-22 RX ORDER — POTASSIUM CHLORIDE 1500 MG/1
20 TABLET, EXTENDED RELEASE ORAL DAILY
Qty: 30 TABLET | Refills: 3 | Status: SHIPPED | OUTPATIENT
Start: 2023-06-22 | End: 2023-10-17 | Stop reason: SDUPTHER

## 2023-06-22 RX ORDER — PANTOPRAZOLE SODIUM 40 MG/1
40 TABLET, DELAYED RELEASE ORAL DAILY
Qty: 30 TABLET | Refills: 3 | Status: SHIPPED | OUTPATIENT
Start: 2023-06-22 | End: 2023-10-17 | Stop reason: SDUPTHER

## 2023-07-27 ENCOUNTER — OFFICE VISIT (OUTPATIENT)
Dept: PRIMARY CARE | Facility: CLINIC | Age: 83
End: 2023-07-27
Payer: MEDICARE

## 2023-07-27 VITALS
RESPIRATION RATE: 20 BRPM | DIASTOLIC BLOOD PRESSURE: 62 MMHG | HEART RATE: 71 BPM | SYSTOLIC BLOOD PRESSURE: 105 MMHG | OXYGEN SATURATION: 97 % | TEMPERATURE: 97.8 F | BODY MASS INDEX: 23.72 KG/M2 | WEIGHT: 156 LBS

## 2023-07-27 DIAGNOSIS — L03.116 CELLULITIS OF LEFT LOWER EXTREMITY: Primary | ICD-10-CM

## 2023-07-27 PROCEDURE — 99213 OFFICE O/P EST LOW 20 MIN: CPT | Performed by: FAMILY MEDICINE

## 2023-07-27 RX ORDER — CEPHALEXIN 500 MG/1
500 CAPSULE ORAL 3 TIMES DAILY
Qty: 30 CAPSULE | Refills: 0 | Status: SHIPPED | OUTPATIENT
Start: 2023-07-27 | End: 2023-08-06

## 2023-07-27 NOTE — PROGRESS NOTES
Subjective   Patient ID: Jero Aguliar is a 82 y.o. male who presents for Skin Problem (Left calf, started approx a few days ago.  Not painful or sore or itchy. ).    HPI   Patient comes in today brought in by his wife for evaluation of a superficial abrasion and scabbing on the lateral left calf.  Patient had this happen before and wound up having significant cellulitis and being hospitalized.      6/5/2023  Patient comes in today with his wife Cyndi for Medicare wellness exam.  His wife reports that he has been losing weight.  He is down 9 pounds from April when he was here last and 25 pounds from January 2023.  He is on 40 mg of Lasix daily as well as having a diminished appetite.  He cannot explain why and his wife is frustrated because he only eats half of what she gives him.  She will make him a sandwich and he will eat half.  She will make him 2 pieces of toast and he will eat 1.  Patient had an EGD in March 2023 which showed gastric and antral erosions as well as duodenitis.  Patient is on Protonix 40 mg a day.    Review of Systems   All other systems reviewed and are negative.      Objective   /62   Pulse 71   Temp 36.6 °C (97.8 °F)   Resp 20   Wt 70.8 kg (156 lb)   SpO2 97%   BMI 23.72 kg/m²     Physical Exam  Vitals reviewed.   Constitutional:       Appearance: He is well-developed.   HENT:      Head: Normocephalic and atraumatic.      Right Ear: Tympanic membrane, ear canal and external ear normal.      Left Ear: Tympanic membrane, ear canal and external ear normal.      Nose: Nose normal.      Mouth/Throat:      Mouth: Mucous membranes are moist.      Pharynx: Oropharynx is clear.   Eyes:      Extraocular Movements: Extraocular movements intact.      Conjunctiva/sclera: Conjunctivae normal.      Pupils: Pupils are equal, round, and reactive to light.   Cardiovascular:      Rate and Rhythm: Normal rate and regular rhythm.      Heart sounds: Normal heart sounds. No murmur heard.  Pulmonary:       Effort: Pulmonary effort is normal.      Breath sounds: Normal breath sounds. No wheezing.   Abdominal:      General: Abdomen is flat. Bowel sounds are normal.      Palpations: Abdomen is soft.   Musculoskeletal:         General: Signs of injury (Superficial scabbing and abrasion lateral left calf with mild redness surrounding) present. Normal range of motion.   Skin:     General: Skin is warm and dry.   Neurological:      General: No focal deficit present.      Mental Status: He is alert and oriented to person, place, and time. Mental status is at baseline.   Psychiatric:         Mood and Affect: Mood normal.         Behavior: Behavior normal.         Thought Content: Thought content normal.         Judgment: Judgment normal.         Assessment/Plan   Problem List Items Addressed This Visit    None  Visit Diagnoses       Cellulitis of left lower extremity    -  Primary    Relevant Medications    cephalexin (Keflex) 500 mg capsule        Take new medication as directed.  Continue other medications as directed.  RTC in 2 weeks for recheck, sooner should problems arise.  Medication list reconciled.  Thank you for visiting today!      Professional services: Some of this note was completed using Dragon voice technology and sometimes the software misinterprets words. This may include unintended errors with respect to translation of words, typographical errors or grammar errors which may not have been identified prior to finalization of the chart note. Please take this into account when reading the note. Thank you.

## 2023-10-16 DIAGNOSIS — K21.9 GASTROESOPHAGEAL REFLUX DISEASE WITHOUT ESOPHAGITIS: ICD-10-CM

## 2023-10-16 DIAGNOSIS — E87.6 HYPOKALEMIA: ICD-10-CM

## 2023-10-16 NOTE — TELEPHONE ENCOUNTER
Patients wife Cyndi called requesting a refill on his     Pantoprazole 40 mg  Potassium Chloride 20 mEq      Sent to [facility] in [place]       Thank You       Patient last seen 7/27/23

## 2023-10-17 RX ORDER — POTASSIUM CHLORIDE 20 MEQ/1
20 TABLET, EXTENDED RELEASE ORAL DAILY
Qty: 90 TABLET | Refills: 0 | Status: SHIPPED | OUTPATIENT
Start: 2023-10-17 | End: 2024-01-03 | Stop reason: SDUPTHER

## 2023-10-17 RX ORDER — PANTOPRAZOLE SODIUM 40 MG/1
40 TABLET, DELAYED RELEASE ORAL DAILY
Qty: 90 TABLET | Refills: 0 | Status: SHIPPED | OUTPATIENT
Start: 2023-10-17 | End: 2023-11-29 | Stop reason: SDUPTHER

## 2023-10-17 NOTE — TELEPHONE ENCOUNTER
Requested Prescriptions     Pending Prescriptions Disp Refills    pantoprazole (ProtoNix) 40 mg EC tablet 90 tablet 0     Sig: Take 1 tablet (40 mg) by mouth once daily. Do not crush, chew, or split.    potassium chloride CR 20 mEq ER tablet 90 tablet 0     Sig: Take 1 tablet (20 mEq) by mouth once daily. Do not crush, chew, or split.

## 2023-10-22 PROBLEM — T46.4X5A ADVERSE EFFECT OF ANGIOTENSIN-CONVERTING ENZYME INHIBITOR: Status: ACTIVE | Noted: 2023-10-22

## 2023-10-22 PROBLEM — L84 FOOT CALLUS: Status: ACTIVE | Noted: 2023-10-22

## 2023-10-22 PROBLEM — I42.2 PRIMARY HYPERTROPHIC CARDIOMYOPATHY (MULTI): Status: ACTIVE | Noted: 2023-10-22

## 2023-10-22 PROBLEM — M96.1 LUMBAR POST-LAMINECTOMY SYNDROME: Status: ACTIVE | Noted: 2022-04-18

## 2023-10-22 PROBLEM — D69.6 LOW PLATELET COUNT (CMS-HCC): Status: ACTIVE | Noted: 2023-10-22

## 2023-10-22 PROBLEM — E78.5 HYPERLIPIDEMIA: Status: ACTIVE | Noted: 2023-10-22

## 2023-10-22 PROBLEM — K44.9 DIAPHRAGMATIC HERNIA WITHOUT OBSTRUCTION OR GANGRENE: Status: ACTIVE | Noted: 2023-03-22

## 2023-10-22 PROBLEM — D47.2 MGUS (MONOCLONAL GAMMOPATHY OF UNKNOWN SIGNIFICANCE): Status: ACTIVE | Noted: 2023-05-02

## 2023-10-22 PROBLEM — R35.0 URINARY FREQUENCY: Status: ACTIVE | Noted: 2023-10-22

## 2023-10-22 PROBLEM — R41.3 MEMORY LOSS: Status: ACTIVE | Noted: 2023-10-22

## 2023-10-22 PROBLEM — M54.16 RADICULOPATHY, LUMBAR REGION: Status: ACTIVE | Noted: 2023-03-22

## 2023-10-22 PROBLEM — N40.0 BENIGN PROSTATIC HYPERPLASIA: Status: ACTIVE | Noted: 2023-10-22

## 2023-10-22 PROBLEM — M19.90 OSTEOARTHRITIS: Status: ACTIVE | Noted: 2023-10-22

## 2023-10-22 PROBLEM — Q27.30: Status: ACTIVE | Noted: 2023-03-22

## 2023-10-22 PROBLEM — K22.10 ULCER OF ESOPHAGUS WITHOUT BLEEDING: Status: ACTIVE | Noted: 2023-03-22

## 2023-10-22 PROBLEM — K92.2 LOWER GI BLEED: Status: ACTIVE | Noted: 2023-10-22

## 2023-10-22 PROBLEM — N18.32 STAGE 3B CHRONIC KIDNEY DISEASE (MULTI): Status: ACTIVE | Noted: 2023-03-22

## 2023-10-22 PROBLEM — R26.2 DIFFICULTY IN WALKING, NOT ELSEWHERE CLASSIFIED: Status: ACTIVE | Noted: 2023-03-22

## 2023-10-22 PROBLEM — B35.1 ONYCHOMYCOSIS: Status: ACTIVE | Noted: 2023-10-22

## 2023-10-22 PROBLEM — K21.9 GASTROESOPHAGEAL REFLUX DISEASE: Status: ACTIVE | Noted: 2023-10-22

## 2023-10-22 PROBLEM — L03.116 CELLULITIS OF LEFT LOWER EXTREMITY: Status: ACTIVE | Noted: 2023-10-22

## 2023-10-22 PROBLEM — I42.9 CARDIOMYOPATHY, UNSPECIFIED (MULTI): Status: ACTIVE | Noted: 2023-03-22

## 2023-10-25 ENCOUNTER — OFFICE VISIT (OUTPATIENT)
Dept: CARDIOLOGY | Facility: CLINIC | Age: 83
End: 2023-10-25
Payer: MEDICARE

## 2023-10-25 VITALS
HEIGHT: 67 IN | WEIGHT: 154.6 LBS | BODY MASS INDEX: 24.27 KG/M2 | HEART RATE: 72 BPM | OXYGEN SATURATION: 98 % | SYSTOLIC BLOOD PRESSURE: 120 MMHG | DIASTOLIC BLOOD PRESSURE: 80 MMHG

## 2023-10-25 DIAGNOSIS — I35.1 MILD AORTIC INSUFFICIENCY: ICD-10-CM

## 2023-10-25 DIAGNOSIS — E78.00 PURE HYPERCHOLESTEROLEMIA: ICD-10-CM

## 2023-10-25 DIAGNOSIS — I10 ESSENTIAL HYPERTENSION: Primary | ICD-10-CM

## 2023-10-25 DIAGNOSIS — I42.2 PRIMARY HYPERTROPHIC CARDIOMYOPATHY (MULTI): ICD-10-CM

## 2023-10-25 DIAGNOSIS — I34.0 NONRHEUMATIC MITRAL VALVE REGURGITATION: ICD-10-CM

## 2023-10-25 DIAGNOSIS — I48.91 ATRIAL FIBRILLATION, UNSPECIFIED TYPE (MULTI): ICD-10-CM

## 2023-10-25 PROCEDURE — 1159F MED LIST DOCD IN RCRD: CPT | Performed by: INTERNAL MEDICINE

## 2023-10-25 PROCEDURE — 1036F TOBACCO NON-USER: CPT | Performed by: INTERNAL MEDICINE

## 2023-10-25 PROCEDURE — 3079F DIAST BP 80-89 MM HG: CPT | Performed by: INTERNAL MEDICINE

## 2023-10-25 PROCEDURE — 99214 OFFICE O/P EST MOD 30 MIN: CPT | Performed by: INTERNAL MEDICINE

## 2023-10-25 PROCEDURE — 3074F SYST BP LT 130 MM HG: CPT | Performed by: INTERNAL MEDICINE

## 2023-10-25 PROCEDURE — 1160F RVW MEDS BY RX/DR IN RCRD: CPT | Performed by: INTERNAL MEDICINE

## 2023-10-25 PROCEDURE — 93000 ELECTROCARDIOGRAM COMPLETE: CPT | Performed by: INTERNAL MEDICINE

## 2023-10-25 NOTE — PROGRESS NOTES
No chest pain, no dyspnea, no palapitions, no edema      Review of Systems   All other systems reviewed and are negative.       Physical Exam  Constitutional:       Appearance: Normal appearance.   HENT:      Head: Normocephalic.   Eyes:      Pupils: Pupils are equal, round, and reactive to light.   Cardiovascular:      Rate and Rhythm: Normal rate and regular rhythm.   Musculoskeletal:      Right lower le+ Edema present.      Left lower leg: Edema present.   Neurological:      Mental Status: He is alert.          Problem List Items Addressed This Visit          Cardiac and Vasculature    Atrial fibrillation (CMS/HCC)    Mild aortic insufficiency    Current Assessment & Plan     23 echocardiogram mild AI         Essential hypertension - Primary    Relevant Orders    ECG 12 Lead (Completed)    Hyperlipidemia    Primary hypertrophic cardiomyopathy (CMS/HCC)    Current Assessment & Plan     23 echocardiogram moderate to severe LV  with LOVE Valsalva gradient = 5.37LVEF = , TR with slightly elevated RVSP with F/U echo 2024         Nonrheumatic mitral valve regurgitation    Current Assessment & Plan     Moderate of 23 echocardiogram

## 2023-10-25 NOTE — ASSESSMENT & PLAN NOTE
4/11/23 echocardiogram moderate to severe LV  with LOVE Valsalva gradient = 5.37LVEF = , TR with slightly elevated RVSP with F/U echo 4/2024

## 2023-11-01 DIAGNOSIS — I48.91 ATRIAL FIBRILLATION, UNSPECIFIED TYPE (MULTI): ICD-10-CM

## 2023-11-03 RX ORDER — METOPROLOL SUCCINATE 25 MG/1
25 TABLET, EXTENDED RELEASE ORAL DAILY
Qty: 90 TABLET | Refills: 3 | Status: SHIPPED | OUTPATIENT
Start: 2023-11-03 | End: 2024-03-26 | Stop reason: ALTCHOICE

## 2023-11-29 ENCOUNTER — HOSPITAL ENCOUNTER (OUTPATIENT)
Dept: RADIOLOGY | Facility: EXTERNAL LOCATION | Age: 83
Discharge: HOME | End: 2023-11-29

## 2023-11-29 ENCOUNTER — OFFICE VISIT (OUTPATIENT)
Dept: PRIMARY CARE | Facility: CLINIC | Age: 83
End: 2023-11-29
Payer: MEDICARE

## 2023-11-29 VITALS
OXYGEN SATURATION: 100 % | TEMPERATURE: 97.9 F | WEIGHT: 150 LBS | RESPIRATION RATE: 20 BRPM | DIASTOLIC BLOOD PRESSURE: 67 MMHG | HEART RATE: 69 BPM | BODY MASS INDEX: 23.49 KG/M2 | SYSTOLIC BLOOD PRESSURE: 114 MMHG

## 2023-11-29 DIAGNOSIS — K21.9 GASTROESOPHAGEAL REFLUX DISEASE WITHOUT ESOPHAGITIS: ICD-10-CM

## 2023-11-29 DIAGNOSIS — F43.9 STRESS AT HOME: ICD-10-CM

## 2023-11-29 DIAGNOSIS — E78.5 HYPERLIPIDEMIA, UNSPECIFIED HYPERLIPIDEMIA TYPE: ICD-10-CM

## 2023-11-29 DIAGNOSIS — R60.9 EDEMA, UNSPECIFIED TYPE: ICD-10-CM

## 2023-11-29 DIAGNOSIS — R63.4 UNEXPLAINED WEIGHT LOSS: Primary | ICD-10-CM

## 2023-11-29 PROCEDURE — 99214 OFFICE O/P EST MOD 30 MIN: CPT | Performed by: FAMILY MEDICINE

## 2023-11-29 PROCEDURE — 3078F DIAST BP <80 MM HG: CPT | Performed by: FAMILY MEDICINE

## 2023-11-29 PROCEDURE — 1159F MED LIST DOCD IN RCRD: CPT | Performed by: FAMILY MEDICINE

## 2023-11-29 PROCEDURE — 3074F SYST BP LT 130 MM HG: CPT | Performed by: FAMILY MEDICINE

## 2023-11-29 PROCEDURE — 1036F TOBACCO NON-USER: CPT | Performed by: FAMILY MEDICINE

## 2023-11-29 PROCEDURE — 1160F RVW MEDS BY RX/DR IN RCRD: CPT | Performed by: FAMILY MEDICINE

## 2023-11-29 RX ORDER — SIMVASTATIN 40 MG/1
40 TABLET, FILM COATED ORAL NIGHTLY
Qty: 90 TABLET | Refills: 1 | Status: SHIPPED | OUTPATIENT
Start: 2023-11-29

## 2023-11-29 RX ORDER — FUROSEMIDE 20 MG/1
10 TABLET ORAL DAILY
Qty: 15 TABLET | Refills: 0 | Status: SHIPPED | OUTPATIENT
Start: 2023-11-29 | End: 2024-01-29 | Stop reason: SDUPTHER

## 2023-11-29 RX ORDER — PANTOPRAZOLE SODIUM 40 MG/1
40 TABLET, DELAYED RELEASE ORAL DAILY
Qty: 90 TABLET | Refills: 1 | Status: SHIPPED | OUTPATIENT
Start: 2023-11-29 | End: 2024-05-27

## 2023-11-29 NOTE — PROGRESS NOTES
Subjective   Patient ID: Jero Aguilar is a 83 y.o. male who presents for Follow-up (6 mo med fu. No questions, concerns, or complaints. /).    HPI   Patient comes in today with his wife Katie who continues to be frustrated over his lack of appetite.  He has lost 25 pounds since January 2023.  He had gained back 4 pounds in October however today he is down those 4 pounds again.  He had an EGD in March 2023 which showed gastric and antral erosions and he continues to take the Protonix 40 mg daily.  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    He also continues to have a very dry mouth which is likely due to the medications he is on.    His mental status is also not good and his wife is requesting to see psych for both him and herself.    7/27/2023  Patient comes in today brought in by his wife for evaluation of a superficial abrasion and scabbing on the lateral left calf.  Patient had this happen before and wound up having significant cellulitis and being hospitalized.    6/5/2023  Patient comes in today with his wife Cyndi for Medicare wellness exam.  His wife reports that he has been losing weight.  He is down 9 pounds from April when he was here last and 25 pounds from January 2023.  He is on 40 mg of Lasix daily as well as having a diminished appetite.  He cannot explain why and his wife is frustrated because he only eats half of what she gives him.  She will make him a sandwich and he will eat half.  She will make him 2 pieces of toast and he will eat 1.  Patient had an EGD in March 2023 which showed gastric and antral erosions as well as duodenitis.  Patient is on Protonix 40 mg a day.    4/3/2023  Patient comes in today with his wife for follow-up on recent hospitalization.  Patient was seen here on 3/16/2023 and lab work was done and he was found to be quite anemic.  He was notified of the anemia and recommended he go to  the ER for further evaluation and work-up.  He was seen at [facility] and his FOBT was positive in the ED.  Patient was admitted and an EGD and colonoscopy were done.  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  His hemoglobin was stable he did receive 1 dose of Venofer.  His Xarelto was discontinued in the hospital and he was instructed to restart it again for a week or 2.  He has an upcoming appointment with his cardiologist in 2 weeks.  He has chronic atrial fib.  Upon discharge from the hospital he was sent to a nursing home where he stayed for a couple of days before his wife brought him home AMA.  She states that since he has been home he has not complained about leg or back pain.  Also the swelling in his legs has significantly decreased.    3/16/2023  Patient presents today with his wife for follow-up on the swelling of the right lower extremity.  He has been using the furosemide on an as-needed basis now and he usually does not use it on days when he is leaving the house.  His wife also noticed an ulcer on the lateral side of his left lower extremity.  It is a shallow ulcer and approximately a centimeter in diameter.  The left leg is not as swollen as the right.  Patient is also complaining of significant discomfort in the right thigh and significant loss of function of the right lower extremity.  He cannot lift it on his own.        3/2/2023  Patient comes in today with his wife for follow-up on multiple issues. His right lower extremity swelling has improved remarkably from this visit and he has been sleeping through the night without having to get up to urinate. He still is having trouble moving the right leg and foot. He also has noted since adding the oxybutinin his appetite/taste buds have changed and he has a very dry mouth.    He has concerns about the number of meds he is taking and is frustrated that he is disabled and  not able to do the things that he was able to do before.     2/16/2023  Patient comes in today with his wife for follow-up from last month. He is complaining of a dry mouth. He claims he is also unable to taste x a month. He has had a loss of appetite. Last month when he went to see his urologist he was put on oxybutynin. So far he and his wife have not noticed much of a difference.    His legs are still swollen especially the right lower extremity. He does not have the redness there any longer however. He is still extremely weak and is in a wheelchair.    1/12/2023  Patient returns today with his wife for recheck of the swelling of his left lower extremity. He continues to have problems. He went back to the ER on 12/28/2022 after his left leg seemed to get worse and he was placed on clindamycin 450 mg 3 times a day for 7 days. His wife feels that his leg has improved since that time with decrease in the swelling and redness but there is still redness around the knee and some slight increased warmth there along with 2-3+ pitting edema of the left lower extremity. Wife feels the improvement has been about 50%.    Patient also having problems with frequent urination, urgency and urinary incontinence. He is currently on Gemtesa 75 mg and furosemide 60 mg daily. The patient and his wife do not want to increase the water pill if they can avoid it.    12/19/2022  Patient comes in today with his wife for recheck of the swelling of his left lower extremity. This occurred initially on 12/9/2022 and he was sent to the ER at [facility] where he after evaluation he was determined to have cellulitis of his left lower extremity. He was placed on antibiotics which he is still on but his wife became concerned because he continues to have edema of his left lower extremity. In review of the ER notes vascular study was performed on the left lower extremity and was negative for clot. At this time there is pitting edema of the left lower  extremity but no erythema or pain.    Unrelated to the leg swelling his wife has noticed a decrease in his mental faculties with memory loss and forgetfulness.    6/24/2022  Patient comes in today with his wife for reevaluation of his right leg. Since he was here in April he has been going to physical therapy and he is currently walking with a walker but has significant weakness of his right thigh. The physical therapy does not seem to be helping. Patient had fusion of 2-3 vertebrae in the lumbar region back in April 2012 by Dr. Perez at [facility] but he is no longer around. He does have hardware in his back and because of this we will need to find out if he can have an MRI or if he needs a CT scan.    4/18/20222  Patient comes in today with his wife for Medicare wellness exam but also with complaints of difficulty walking. He is unable to walk without a cane or a walker due to discomfort in his right thigh. He has been going on for a few months. He thinks it somehow may be related to his back surgery and April 2012. He claims that his right leg drags and he has to physically lift his right leg to do things such as put on his socks and shoes.    10/5/2021  Patient comes in today for Medicare wellness exam. He is currently without complaints. He follows on a regular basis with the urologist and oncologist for his prostate cancer history.    4/9/2021  Patient comes in today with his wife for evaluation of a tender lump in his left lower back. He noticed it a few days ago and had his wife look at it and she recommended he come in for further evaluation. It is tender to touch and inflamed in approximately 1-1/2 cm in diameter. It appears to be an infected sebaceous cyst.    3/17/2021  Patient comes in today with his wife for 6-month checkup and refill of medication. He is currently without complaints. He is not due for lab work until his physical in September. He and his wife have both gotten the  "Covid vaccines.    9/30/2020 patient comes in today with his wife for checkup and refill medication. He is also here for Medicare wellness exam. He denies any current complaints.    10/7/19  Patient comes in today for a Medicare wellness exam. He is currently without complaints. Since he was here last he was in to see Dr. Wallace about this rash and was given a sample to put on his legs 10 days off 10 days on and it has cleared up the rash. He doesn't know what the name of the medicine is off hand, he will call back with the name.    8/21/19  Patient comes back in today with his wife for follow-up on his rash. The rash continues to be present and so far it has thwarted all efforts to get rid of it. It is only present on both extremities up to the midcalf region. The steroids have lessened but not gotten rid of it. Will refer patient to dermatology for further evaluation and possible biopsy of the lesions.    Patient also would like a refill of his potassium but would like capsules instead of tablets.    8/12/19  Patient comes in today with his wife for checkup and follow-up on his medication. He has also developed a rash on his lower extremities bilaterally on the medial pretibial region. He has been out gardening and pulling weeds. There is currently no edema in either lower extremity. He denies any new soaps, detergents or other exposure.    7/9/2019   The patient is a 78 year old male who presents for a Recheck of Follow up. The patient is here to discuss a single health problem: bw (Pt is here to discuss bw done with another doctor.). Note for \"Follow up\": Patient comes in today with his wife for follow-up on his low potassium. His last potassium was 3.2. He has been on potassium replacement.    Patient is also looking for a \"memory loss pill\". He has been having problems remembering things of late. The visiting nurse to come out to his house from the insurance company and one thing he couldn't do was remember 3 " objects that she named. The rest of things he could do.    Review of Systems   All other systems reviewed and are negative.      Objective   /67   Pulse 69   Temp 36.6 °C (97.9 °F)   Resp 20   Wt 68 kg (150 lb)   SpO2 100%   BMI 23.49 kg/m²     Physical Exam  Constitutional:       Appearance: He is underweight.   HENT:      Head: Normocephalic and atraumatic.      Right Ear: Tympanic membrane, ear canal and external ear normal.      Left Ear: Tympanic membrane, ear canal and external ear normal.      Nose: Nose normal.      Mouth/Throat:      Mouth: Mucous membranes are moist.      Pharynx: Oropharynx is clear.   Eyes:      Extraocular Movements: Extraocular movements intact.      Conjunctiva/sclera: Conjunctivae normal.      Pupils: Pupils are equal, round, and reactive to light.   Cardiovascular:      Rate and Rhythm: Normal rate and regular rhythm.      Heart sounds: Normal heart sounds. No murmur heard.  Pulmonary:      Effort: Pulmonary effort is normal.      Breath sounds: Normal breath sounds. No wheezing.   Abdominal:      General: Abdomen is flat. Bowel sounds are normal.      Palpations: Abdomen is soft.   Musculoskeletal:         General: Normal range of motion.      Comments: Patient uses a rollator but his wife is using it like a wheelchair today.   Skin:     General: Skin is warm and dry.   Neurological:      General: No focal deficit present.      Mental Status: He is alert and oriented to person, place, and time. Mental status is at baseline.   Psychiatric:         Cognition and Memory: Cognition is impaired. Memory is impaired.         Assessment/Plan   Problem List Items Addressed This Visit             ICD-10-CM    Edema - Primary R60.9    Relevant Medications    furosemide (Lasix) 20 mg tablet    Unexplained weight loss R63.4    Hyperlipidemia E78.5    Relevant Medications    simvastatin (Zocor) 40 mg tablet    Gastroesophageal reflux disease K21.9    Relevant Medications     pantoprazole (ProtoNix) 40 mg EC tablet    Other Relevant Orders    FL GI esophagram (Completed)     Other Visit Diagnoses         Codes    Stress at home     F43.9    Relevant Orders    Referral to Psychology        Will get swallowing esophagram and depending on what that shows and decide on further treatment.  Referral to psych.  Continue all current meds.  RTC in one month for recheck, sooner should problems arise.  Medication list reconciled.  Thank you for visiting today!      Professional services: Some of this note was completed using Dragon voice technology and sometimes the software misinterprets words. This may include unintended errors with respect to translation of words, typographical errors or grammar errors which may not have been identified prior to finalization of the chart note. Please take this into account when reading the note. Thank you.

## 2023-12-08 ENCOUNTER — TELEPHONE (OUTPATIENT)
Dept: PRIMARY CARE | Facility: CLINIC | Age: 83
End: 2023-12-08
Payer: MEDICARE

## 2023-12-08 NOTE — TELEPHONE ENCOUNTER
Wife, cristiane, was given results. No questions at this time. Results review mailed to them per request.

## 2023-12-08 NOTE — TELEPHONE ENCOUNTER
----- Message from Dameon Iniguez DO sent at 12/7/2023  6:56 AM EST -----  Regarding: Swallowing esophagram  Please notify Mrs. Aguilar that Jero's esophagram was abnormal with aspiration of thickened foods and delayed cough reflex.  Would recommend that he follow-up with his GI doctor for further evaluation and recommendations.  In the meantime she should make sure that his food is cut up into small pieces and easily chewed and digested.  ----- Message -----  From: Delilah Snowden - Imaging Results In  Sent: 12/4/2023   3:31 PM EST  To: Dameon Iniguez DO

## 2023-12-22 ENCOUNTER — PATIENT OUTREACH (OUTPATIENT)
Dept: PRIMARY CARE | Facility: CLINIC | Age: 83
End: 2023-12-22
Payer: MEDICARE

## 2023-12-22 ENCOUNTER — DOCUMENTATION (OUTPATIENT)
Dept: PRIMARY CARE | Facility: CLINIC | Age: 83
End: 2023-12-22
Payer: MEDICARE

## 2023-12-22 NOTE — PROGRESS NOTES
Discharge Facility: [facility]  Discharge Diagnosis: fall, laceration of scalp  Admission Date: 12/19/2023  Discharge Date: 12/21/2023    PCP Appointment Date: 1/3/2024  Specialist Appointment Date: none  Hospital Encounter and Summary: Linked   See discharge assessment below for further details    [facility]   [facility] referral placed in [facility] for home PT/OT needs   Drink Boost (nutritional supplementation) one, three times daily with meals.     Engagement  Call Start Time: 1017 (12/22/2023 10:17 AM)    Medications  Medications reviewed with patient/caregiver?: Yes (12/22/2023 10:17 AM)  Is the patient having any side effects they believe may be caused by any medication additions or changes?: No (12/22/2023 10:17 AM)  Does the patient have all medications ordered at discharge?: Yes (12/22/2023 10:17 AM)  Care Management Interventions: No intervention needed (12/22/2023 10:17 AM)  Prescription Comments: no new meds (12/22/2023 10:17 AM)  Is the patient taking all medications as directed (includes completed medication regime)?: Yes (12/22/2023 10:17 AM)  Medication Comments: see med list (12/22/2023 10:17 AM)    Appointments  Does the patient have a primary care provider?: Yes (12/22/2023 10:17 AM)  Care Management Interventions: Verified appointment date/time/provider (12/22/2023 10:17 AM)  Has the patient kept scheduled appointments due by today?: Yes (12/22/2023 10:17 AM)  Care Management Interventions: Advised patient to keep appointment (12/22/2023 10:17 AM)    Self Management  What is the home health agency?: [facility] referral [facility] in [facility] for home PT/OT needs.  [facility] (12/22/2023 10:17 AM)  Has home health visited the patient within 72 hours of discharge?: Call prior to 72 hours (12/22/2023 10:17 AM)    Patient Teaching  Does the patient have access to their discharge instructions?: Yes (12/22/2023 10:17 AM)  Care Management Interventions: Reviewed instructions with patient (12/22/2023  10:17 AM)  What is the patient's perception of their health status since discharge?: Improving (12/22/2023 10:17 AM)  Is the patient/caregiver able to teach back the hierarchy of who to call/visit for symptoms/problems? PCP, Specialist, Home Health nurse, Urgent Care, ED, 911: Yes (12/22/2023 10:17 AM)    Wrap Up  Wrap Up Additional Comments: CTS spoke with patients spouse. She stated that he was doing good. Denies any falls since being home. Patient does walk with a walker. Spouser stated that she is keeping a close eye on patient. No med changes reported. PCP appt is scheduled and patient spouse is aware of the date and time. No questions or concerns at this time. (12/22/2023 10:17 AM)  Call End Time: 1021 (12/22/2023 10:17 AM)

## 2024-01-03 ENCOUNTER — OFFICE VISIT (OUTPATIENT)
Dept: PRIMARY CARE | Facility: CLINIC | Age: 84
End: 2024-01-03
Payer: MEDICARE

## 2024-01-03 VITALS
DIASTOLIC BLOOD PRESSURE: 71 MMHG | RESPIRATION RATE: 20 BRPM | TEMPERATURE: 98.4 F | OXYGEN SATURATION: 97 % | BODY MASS INDEX: 23.18 KG/M2 | HEART RATE: 75 BPM | WEIGHT: 148 LBS | SYSTOLIC BLOOD PRESSURE: 120 MMHG

## 2024-01-03 DIAGNOSIS — E87.6 HYPOKALEMIA: ICD-10-CM

## 2024-01-03 DIAGNOSIS — R63.4 EXCESSIVE WEIGHT LOSS: ICD-10-CM

## 2024-01-03 DIAGNOSIS — F32.A DEPRESSION, UNSPECIFIED DEPRESSION TYPE: Primary | ICD-10-CM

## 2024-01-03 PROCEDURE — 99495 TRANSJ CARE MGMT MOD F2F 14D: CPT | Performed by: FAMILY MEDICINE

## 2024-01-03 RX ORDER — MIRTAZAPINE 7.5 MG/1
7.5 TABLET, FILM COATED ORAL NIGHTLY
Qty: 30 TABLET | Refills: 2 | Status: SHIPPED | OUTPATIENT
Start: 2024-01-03 | End: 2024-03-26 | Stop reason: SDUPTHER

## 2024-01-03 RX ORDER — POTASSIUM CHLORIDE 20 MEQ/1
20 TABLET, EXTENDED RELEASE ORAL DAILY
Qty: 90 TABLET | Refills: 1 | Status: SHIPPED | OUTPATIENT
Start: 2024-01-03 | End: 2024-07-01

## 2024-01-03 NOTE — PROGRESS NOTES
Subjective   Patient ID: Jero Aguilar is a 83 y.o. male who presents for Transfer Of Care (Fall 12/21. Was in the basement and lost balance between stairs and walker. Got two stiches for laceration on the back of his head. ).    HPI   Patient comes in today with his wife Katie for follow-up from recent ER visit to [facility].  He had a fall in his basement on 12/21/2023 where apparently he lost his balance between the stairs and his walker and he fell backward and hit his head.  His wife came down the basement steps and found him pretty much immediately.  He had not lost consciousness.  He just so happened that his daughter was coming to visit at the same time and she called 911 and the patient was transported to [facility] because it is a trauma center.  They are physically closer to [facility].    He was fully evaluated in the ER and 2 disposable sutures were placed.  It was recommended that he stay in the hospital for further evaluation and the patient and his wife declined.  He was determined to be stable for discharge, treated and released with instructions to follow-up here.    Patient also apparently just saw Dr. Tillman and apparently there was some disconnect with the family and the doctor as to why they were there.  I had sent him there because of his significant weight loss.  He has lost almost 30 pounds since January of last year.  He had an abnormal esophagram on 12/4/2023.  He apparently was seen by the doctor and instructed to come back in 3 months.    Review of Systems   All other systems reviewed and are negative.      Objective   /71   Pulse 75   Temp 36.9 °C (98.4 °F)   Resp 20   Wt 67.1 kg (148 lb)   SpO2 97%   BMI 23.18 kg/m²     Physical Exam  Constitutional:       Appearance: He is well-developed.   HENT:      Head: Normocephalic.      Comments: Posterior scalp wound healing well without sign of infection.     Right Ear: Tympanic membrane, ear canal and  external ear normal.      Left Ear: Tympanic membrane, ear canal and external ear normal.      Nose: Nose normal.      Mouth/Throat:      Mouth: Mucous membranes are moist.      Pharynx: Oropharynx is clear.   Eyes:      Extraocular Movements: Extraocular movements intact.      Conjunctiva/sclera: Conjunctivae normal.      Pupils: Pupils are equal, round, and reactive to light.      Comments: Patient wears glasses   Cardiovascular:      Rate and Rhythm: Normal rate and regular rhythm.      Heart sounds: Normal heart sounds. No murmur heard.  Pulmonary:      Effort: Pulmonary effort is normal.      Breath sounds: Normal breath sounds. No wheezing.   Abdominal:      General: Abdomen is flat. Bowel sounds are normal.      Palpations: Abdomen is soft.   Musculoskeletal:         General: Normal range of motion.   Skin:     General: Skin is warm and dry.   Neurological:      General: No focal deficit present.      Mental Status: He is alert and oriented to person, place, and time. Mental status is at baseline.   Psychiatric:         Mood and Affect: Mood normal.         Behavior: Behavior normal.         Thought Content: Thought content normal.         Judgment: Judgment normal.         Assessment/Plan   Problem List Items Addressed This Visit             ICD-10-CM    Hypokalemia E87.6    Relevant Medications    potassium chloride CR 20 mEq ER tablet     Other Visit Diagnoses         Codes    Depression, unspecified depression type    -  Primary F32.A    Relevant Medications    mirtazapine (Remeron) 7.5 mg tablet    Excessive weight loss     R63.4    Relevant Medications    mirtazapine (Remeron) 7.5 mg tablet        Patient to return to see GI for further evaluation of the weight loss.  Start mirtazapine to increase appetite and for weight gain.    Continue all current meds.  RTC in one month for recheck, sooner should problems arise.  Medication list reconciled.  Thank you for visiting today!      Professional services:  Some of this note was completed using Dragon voice technology and sometimes the software misinterprets words. This may include unintended errors with respect to translation of words, typographical errors or grammar errors which may not have been identified prior to finalization of the chart note. Please take this into account when reading the note. Thank you.

## 2024-01-09 ENCOUNTER — TELEPHONE (OUTPATIENT)
Dept: PRIMARY CARE | Facility: CLINIC | Age: 84
End: 2024-01-09
Payer: MEDICARE

## 2024-01-09 ENCOUNTER — PATIENT OUTREACH (OUTPATIENT)
Dept: PRIMARY CARE | Facility: CLINIC | Age: 84
End: 2024-01-09
Payer: MEDICARE

## 2024-01-09 NOTE — TELEPHONE ENCOUNTER
Patients wife Cyndi is requesting a refill on his       Pantoprazole 40 mg      Sent to [facility] in [place]       Thank You       Patient last seen on 1/3/24

## 2024-01-09 NOTE — PROGRESS NOTES
Unable to reach patient for call back after patient's follow up appointment with PCP. 1/3/2024  LVM with call back number for patient to call if needed

## 2024-01-29 ENCOUNTER — OFFICE VISIT (OUTPATIENT)
Dept: PRIMARY CARE | Facility: CLINIC | Age: 84
End: 2024-01-29
Payer: MEDICARE

## 2024-01-29 VITALS
WEIGHT: 162 LBS | DIASTOLIC BLOOD PRESSURE: 69 MMHG | OXYGEN SATURATION: 99 % | RESPIRATION RATE: 20 BRPM | TEMPERATURE: 97.3 F | SYSTOLIC BLOOD PRESSURE: 122 MMHG | HEART RATE: 65 BPM | BODY MASS INDEX: 25.37 KG/M2

## 2024-01-29 DIAGNOSIS — C61 ADENOCARCINOMA OF PROSTATE (MULTI): ICD-10-CM

## 2024-01-29 DIAGNOSIS — F02.80 ALZHEIMER'S DEMENTIA, UNSPECIFIED DEMENTIA SEVERITY, UNSPECIFIED TIMING OF DEMENTIA ONSET, UNSPECIFIED WHETHER BEHAVIORAL, PSYCHOTIC, OR MOOD DISTURBANCE OR ANXIETY (MULTI): ICD-10-CM

## 2024-01-29 DIAGNOSIS — R29.898 BILATERAL LEG WEAKNESS: ICD-10-CM

## 2024-01-29 DIAGNOSIS — E87.6 HYPOKALEMIA: Primary | ICD-10-CM

## 2024-01-29 DIAGNOSIS — I12.0: ICD-10-CM

## 2024-01-29 DIAGNOSIS — R26.2 DIFFICULTY IN WALKING, NOT ELSEWHERE CLASSIFIED: ICD-10-CM

## 2024-01-29 DIAGNOSIS — N18.5: ICD-10-CM

## 2024-01-29 DIAGNOSIS — G30.9 ALZHEIMER'S DEMENTIA, UNSPECIFIED DEMENTIA SEVERITY, UNSPECIFIED TIMING OF DEMENTIA ONSET, UNSPECIFIED WHETHER BEHAVIORAL, PSYCHOTIC, OR MOOD DISTURBANCE OR ANXIETY (MULTI): ICD-10-CM

## 2024-01-29 DIAGNOSIS — I42.2 PRIMARY HYPERTROPHIC CARDIOMYOPATHY (MULTI): ICD-10-CM

## 2024-01-29 DIAGNOSIS — R60.9 EDEMA, UNSPECIFIED TYPE: ICD-10-CM

## 2024-01-29 PROCEDURE — 99213 OFFICE O/P EST LOW 20 MIN: CPT | Performed by: FAMILY MEDICINE

## 2024-01-29 PROCEDURE — 3078F DIAST BP <80 MM HG: CPT | Performed by: FAMILY MEDICINE

## 2024-01-29 PROCEDURE — 1159F MED LIST DOCD IN RCRD: CPT | Performed by: FAMILY MEDICINE

## 2024-01-29 PROCEDURE — 3074F SYST BP LT 130 MM HG: CPT | Performed by: FAMILY MEDICINE

## 2024-01-29 PROCEDURE — 1036F TOBACCO NON-USER: CPT | Performed by: FAMILY MEDICINE

## 2024-01-29 RX ORDER — FUROSEMIDE 40 MG/1
40 TABLET ORAL DAILY
Qty: 30 TABLET | Refills: 0 | Status: SHIPPED | OUTPATIENT
Start: 2024-01-29 | End: 2024-02-26 | Stop reason: SDUPTHER

## 2024-01-29 NOTE — PROGRESS NOTES
Subjective   Patient ID: Jero Aguilar is a 83 y.o. male who presents for Leg Swelling (Hx of cellulitis. Swollen and hard legs and feet for the last couple weeks with small weeping cuts. Wife put him back on furosemide last week but she didn't notice any improvement. ).    HPI  Patient comes in today with his wife for evaluation of leg and foot swelling and weeping of the skin.  She states it has been going on for several weeks.  She just started him on Lasix 10 mg daily a few days ago and so far she has not noticed any difference.      He did fall this past Sunday and he has ecchymosis and bruising of his right ear.  There was apparently an unwitnessed fall with his wife finding him down just a few seconds after it happened.  Patient states he did not lose consciousness and blames it on the weakness in his legs.  He does wear hearing aids and apparently struck the right ear with the hearing aid behind it causing the bruising.    1/3/2024  Patient comes in today with his wife Katie for follow-up from recent ER visit to [facility].  He had a fall in his basement on 12/21/2023 where apparently he lost his balance between the stairs and his walker and he fell backward and hit his head.  His wife came down the basement steps and found him pretty much immediately.  He had not lost consciousness.  He just so happened that his daughter was coming to visit at the same time and she called 911 and the patient was transported to [facility] ER because it is a trauma center.  They are physically closer to [facility].    He was fully evaluated in the ER and 2 disposable sutures were placed.  It was recommended that he stay in the hospital for further evaluation and the patient and his wife declined.  He was determined to be stable for discharge, treated and released with instructions to follow-up here.    Patient also apparently just saw Dr. Tillman and apparently there was some disconnect with the  family and the doctor as to why they were there.  I had sent him there because of his significant weight loss.  He has lost almost 30 pounds since January of last year.  He had an abnormal esophagram on 12/4/2023.  He apparently was seen by the doctor and instructed to come back in 3 months.    11/29/2023  Patient comes in today with his wife Katie who continues to be frustrated over his lack of appetite.  He has lost 25 pounds since January 2023.  He had gained back 4 pounds in October however today he is down those 4 pounds again.  He had an EGD in March 2023 which showed gastric and antral erosions and he continues to take the Protonix 40 mg daily.  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    He also continues to have a very dry mouth which is likely due to the medications he is on.    His mental status is also not good and his wife is requesting to see psych for both him and herself.     Review of Systems   All other systems reviewed and are negative.      Objective   /69   Pulse 65   Temp 36.3 °C (97.3 °F)   Resp 20   Wt 73.5 kg (162 lb)   SpO2 99%   BMI 25.37 kg/m²     Physical Exam  Vitals reviewed.   Constitutional:       Appearance: He is well-developed.   HENT:      Head: Normocephalic and atraumatic.      Right Ear: Tympanic membrane, ear canal and external ear normal.      Left Ear: Tympanic membrane, ear canal and external ear normal.      Nose: Nose normal.      Mouth/Throat:      Mouth: Mucous membranes are moist.      Pharynx: Oropharynx is clear.   Eyes:      Extraocular Movements: Extraocular movements intact.      Conjunctiva/sclera: Conjunctivae normal.      Pupils: Pupils are equal, round, and reactive to light.   Cardiovascular:      Rate and Rhythm: Normal rate and regular rhythm.      Heart sounds: Normal heart sounds. No murmur heard.  Pulmonary:      Effort: Pulmonary effort is normal.       Breath sounds: Normal breath sounds. No wheezing.   Abdominal:      General: Abdomen is flat. Bowel sounds are normal.      Palpations: Abdomen is soft.   Musculoskeletal:         General: Normal range of motion.      Right lower leg: Edema (2-3+ pitting edema with some weeping present) present.      Left lower leg: Edema (2-3+ pitting edema with some weeping present) present.   Skin:     General: Skin is warm and dry.   Neurological:      General: No focal deficit present.      Mental Status: He is alert and oriented to person, place, and time. Mental status is at baseline.   Psychiatric:         Mood and Affect: Mood normal.         Behavior: Behavior normal.         Thought Content: Thought content normal.         Judgment: Judgment normal.       Assessment/Plan   Problem List Items Addressed This Visit             ICD-10-CM    Adenocarcinoma of prostate (CMS/HCC) C61    Benign hypertensive kidney disease, stage V (CMS/HCC) I12.0, N18.5    Edema R60.9    Relevant Medications    furosemide (Lasix) 40 mg tablet    Hypokalemia - Primary E87.6    Bilateral leg weakness R29.898    Dementia (CMS/HCC) F03.90    Difficulty in walking, not elsewhere classified R26.2    Primary hypertrophic cardiomyopathy (CMS/HCC) I42.2   Take new medication as directed.  Continue other medications as directed.  RTC in one week for recheck, sooner should problems arise.  Medication list reconciled.  Thank you for visiting today!      Professional services: Some of this note was completed using Dragon voice technology and sometimes the software misinterprets words. This may include unintended errors with respect to translation of words, typographical errors or grammar errors which may not have been identified prior to finalization of the chart note. Please take this into account when reading the note. Thank you.

## 2024-02-01 PROBLEM — I48.91 ATRIAL FIBRILLATION (MULTI): Status: RESOLVED | Noted: 2023-02-13 | Resolved: 2024-02-01

## 2024-02-01 PROBLEM — D69.6 LOW PLATELET COUNT (CMS-HCC): Status: RESOLVED | Noted: 2023-10-22 | Resolved: 2024-02-01

## 2024-02-05 ENCOUNTER — APPOINTMENT (OUTPATIENT)
Dept: PRIMARY CARE | Facility: CLINIC | Age: 84
End: 2024-02-05
Payer: MEDICARE

## 2024-02-05 ENCOUNTER — OFFICE VISIT (OUTPATIENT)
Dept: PRIMARY CARE | Facility: CLINIC | Age: 84
End: 2024-02-05
Payer: MEDICARE

## 2024-02-05 VITALS
DIASTOLIC BLOOD PRESSURE: 70 MMHG | RESPIRATION RATE: 20 BRPM | HEART RATE: 72 BPM | WEIGHT: 158 LBS | SYSTOLIC BLOOD PRESSURE: 115 MMHG | BODY MASS INDEX: 24.75 KG/M2 | OXYGEN SATURATION: 97 %

## 2024-02-05 DIAGNOSIS — R60.0 BILATERAL LEG EDEMA: Primary | ICD-10-CM

## 2024-02-05 DIAGNOSIS — I12.0: ICD-10-CM

## 2024-02-05 DIAGNOSIS — F02.80 ALZHEIMER'S DEMENTIA, UNSPECIFIED DEMENTIA SEVERITY, UNSPECIFIED TIMING OF DEMENTIA ONSET, UNSPECIFIED WHETHER BEHAVIORAL, PSYCHOTIC, OR MOOD DISTURBANCE OR ANXIETY (MULTI): ICD-10-CM

## 2024-02-05 DIAGNOSIS — E53.8 VITAMIN B 12 DEFICIENCY: ICD-10-CM

## 2024-02-05 DIAGNOSIS — N18.5: ICD-10-CM

## 2024-02-05 DIAGNOSIS — G30.9 ALZHEIMER'S DEMENTIA, UNSPECIFIED DEMENTIA SEVERITY, UNSPECIFIED TIMING OF DEMENTIA ONSET, UNSPECIFIED WHETHER BEHAVIORAL, PSYCHOTIC, OR MOOD DISTURBANCE OR ANXIETY (MULTI): ICD-10-CM

## 2024-02-05 PROCEDURE — 3078F DIAST BP <80 MM HG: CPT | Performed by: FAMILY MEDICINE

## 2024-02-05 PROCEDURE — 1036F TOBACCO NON-USER: CPT | Performed by: FAMILY MEDICINE

## 2024-02-05 PROCEDURE — 3074F SYST BP LT 130 MM HG: CPT | Performed by: FAMILY MEDICINE

## 2024-02-05 PROCEDURE — 99213 OFFICE O/P EST LOW 20 MIN: CPT | Performed by: FAMILY MEDICINE

## 2024-02-05 PROCEDURE — 1159F MED LIST DOCD IN RCRD: CPT | Performed by: FAMILY MEDICINE

## 2024-02-05 NOTE — PROGRESS NOTES
Subjective   Patient ID: Jero Aguilar is a 83 y.o. male who presents for B12 Injection and Follow-up (For furosemide and swelling in the feet has gone down quite a bit but urinating frequently. ).    HPI   Patient comes in today with his wife for reevaluation of the leg and foot swelling and weeping of the skin.  It has improved significantly since last week.  Patient has lost 4 pounds with the Lasix at 40 mg daily.  He is still up 10 pounds from 1/3/2024.    Patient saw Dr. Peterson this morning and he ordered labs but did not change any of his medicines.    1/29/2024  Patient comes in today with his wife for evaluation of leg and foot swelling and weeping of the skin.  She states it has been going on for several weeks.  She just started him on Lasix 10 mg daily a few days ago and so far she has not noticed any difference.      He did fall this past Sunday and he has ecchymosis and bruising of his right ear.  There was apparently an unwitnessed fall with his wife finding him down just a few seconds after it happened.  Patient states he did not lose consciousness and blames it on the weakness in his legs.  He does wear hearing aids and apparently struck the right ear with the hearing aid behind it causing the bruising.    1/3/2024  Patient comes in today with his wife Katie for follow-up from recent ER visit to [facility].  He had a fall in his basement on 12/21/2023 where apparently he lost his balance between the stairs and his walker and he fell backward and hit his head.  His wife came down the basement steps and found him pretty much immediately.  He had not lost consciousness.  He just so happened that his daughter was coming to visit at the same time and she called 911 and the patient was transported to [facility] ER because it is a trauma center.  They are physically closer to [facility].    He was fully evaluated in the ER and 2 disposable sutures were placed.  It was recommended  that he stay in the hospital for further evaluation and the patient and his wife declined.  He was determined to be stable for discharge, treated and released with instructions to follow-up here.    Patient also apparently just saw Dr. Tillman and apparently there was some disconnect with the family and the doctor as to why they were there.  I had sent him there because of his significant weight loss.  He has lost almost 30 pounds since January of last year.  He had an abnormal esophagram on 12/4/2023.  He apparently was seen by the doctor and instructed to come back in 3 months.    11/29/2023  Patient comes in today with his wife Katie who continues to be frustrated over his lack of appetite.  He has lost 25 pounds since January 2023.  He had gained back 4 pounds in October however today he is down those 4 pounds again.  He had an EGD in March 2023 which showed gastric and antral erosions and he continues to take the Protonix 40 mg daily.  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    He also continues to have a very dry mouth which is likely due to the medications he is on.    His mental status is also not good and his wife is requesting to see psych for both him and herself.       Review of Systems   All other systems reviewed and are negative.      Objective   /70   Pulse 72   Resp 20   Wt 71.7 kg (158 lb)   SpO2 97%   BMI 24.75 kg/m²     Physical Exam  Vitals reviewed.   Constitutional:       Appearance: He is well-developed.   HENT:      Head: Normocephalic and atraumatic.      Right Ear: Tympanic membrane, ear canal and external ear normal.      Left Ear: Tympanic membrane, ear canal and external ear normal.      Nose: Nose normal.      Mouth/Throat:      Mouth: Mucous membranes are moist.      Pharynx: Oropharynx is clear.   Eyes:      Extraocular Movements: Extraocular movements intact.      Conjunctiva/sclera:  Conjunctivae normal.      Pupils: Pupils are equal, round, and reactive to light.   Cardiovascular:      Rate and Rhythm: Normal rate and regular rhythm.      Heart sounds: Normal heart sounds. No murmur heard.  Pulmonary:      Effort: Pulmonary effort is normal.      Breath sounds: Normal breath sounds. No wheezing.   Abdominal:      General: Abdomen is flat. Bowel sounds are normal.      Palpations: Abdomen is soft.   Musculoskeletal:         General: Normal range of motion.      Right lower leg: Edema (1-2+ pitting edema) present.      Left lower leg: Edema (1-2+ pitting edema) present.   Skin:     General: Skin is warm and dry.   Neurological:      General: No focal deficit present.      Mental Status: He is alert and oriented to person, place, and time. Mental status is at baseline.   Psychiatric:         Mood and Affect: Mood normal.         Behavior: Behavior normal.         Thought Content: Thought content normal.         Judgment: Judgment normal.       Assessment/Plan   Problem List Items Addressed This Visit             ICD-10-CM    Benign hypertensive kidney disease, stage V (CMS/[facility]) I12.0, N18.5    Bilateral leg edema - Primary R60.0    Vitamin B 12 deficiency E53.8    Dementia (CMS/[facility]) F03.90   Continue current meds as directed.  Followup in 3 weeks for recheck if all remains stable, sooner if problems arise.  Medication list reconciled.  Thank you for visiting today!      Professional services: Some of this note was completed using Dragon voice technology and sometimes the software misinterprets words. This may include unintended errors with respect to translation of words, typographical errors or grammar errors which may not have been identified prior to finalization of the chart note. Please take this into account when reading the note. Thank you.

## 2024-02-08 ENCOUNTER — PATIENT OUTREACH (OUTPATIENT)
Dept: PRIMARY CARE | Facility: CLINIC | Age: 84
End: 2024-02-08
Payer: MEDICARE

## 2024-02-26 ENCOUNTER — LAB (OUTPATIENT)
Dept: LAB | Facility: LAB | Age: 84
End: 2024-02-26
Payer: MEDICARE

## 2024-02-26 ENCOUNTER — OFFICE VISIT (OUTPATIENT)
Dept: PRIMARY CARE | Facility: CLINIC | Age: 84
End: 2024-02-26
Payer: MEDICARE

## 2024-02-26 VITALS
RESPIRATION RATE: 20 BRPM | DIASTOLIC BLOOD PRESSURE: 69 MMHG | BODY MASS INDEX: 24.75 KG/M2 | HEART RATE: 79 BPM | OXYGEN SATURATION: 95 % | WEIGHT: 158 LBS | SYSTOLIC BLOOD PRESSURE: 117 MMHG | TEMPERATURE: 96.9 F

## 2024-02-26 DIAGNOSIS — R60.9 EDEMA, UNSPECIFIED TYPE: ICD-10-CM

## 2024-02-26 DIAGNOSIS — R26.2 DIFFICULTY IN WALKING, NOT ELSEWHERE CLASSIFIED: ICD-10-CM

## 2024-02-26 DIAGNOSIS — R29.898 BILATERAL LEG WEAKNESS: ICD-10-CM

## 2024-02-26 DIAGNOSIS — F02.80 ALZHEIMER'S DEMENTIA, UNSPECIFIED DEMENTIA SEVERITY, UNSPECIFIED TIMING OF DEMENTIA ONSET, UNSPECIFIED WHETHER BEHAVIORAL, PSYCHOTIC, OR MOOD DISTURBANCE OR ANXIETY (MULTI): Primary | ICD-10-CM

## 2024-02-26 DIAGNOSIS — R41.3 MEMORY LOSS: ICD-10-CM

## 2024-02-26 DIAGNOSIS — G30.9 ALZHEIMER'S DEMENTIA, UNSPECIFIED DEMENTIA SEVERITY, UNSPECIFIED TIMING OF DEMENTIA ONSET, UNSPECIFIED WHETHER BEHAVIORAL, PSYCHOTIC, OR MOOD DISTURBANCE OR ANXIETY (MULTI): Primary | ICD-10-CM

## 2024-02-26 DIAGNOSIS — R63.4 UNEXPLAINED WEIGHT LOSS: ICD-10-CM

## 2024-02-26 LAB
ANION GAP SERPL CALC-SCNC: 12 MMOL/L (ref 10–20)
BUN SERPL-MCNC: 51 MG/DL (ref 6–23)
CALCIUM SERPL-MCNC: 9.3 MG/DL (ref 8.6–10.3)
CHLORIDE SERPL-SCNC: 106 MMOL/L (ref 98–107)
CO2 SERPL-SCNC: 27 MMOL/L (ref 21–32)
CREAT SERPL-MCNC: 2.46 MG/DL (ref 0.5–1.3)
EGFRCR SERPLBLD CKD-EPI 2021: 25 ML/MIN/1.73M*2
GLUCOSE SERPL-MCNC: 94 MG/DL (ref 74–99)
POTASSIUM SERPL-SCNC: 4.1 MMOL/L (ref 3.5–5.3)
SODIUM SERPL-SCNC: 141 MMOL/L (ref 136–145)

## 2024-02-26 PROCEDURE — 99214 OFFICE O/P EST MOD 30 MIN: CPT | Performed by: FAMILY MEDICINE

## 2024-02-26 PROCEDURE — 80048 BASIC METABOLIC PNL TOTAL CA: CPT

## 2024-02-26 PROCEDURE — 3074F SYST BP LT 130 MM HG: CPT | Performed by: FAMILY MEDICINE

## 2024-02-26 PROCEDURE — 1036F TOBACCO NON-USER: CPT | Performed by: FAMILY MEDICINE

## 2024-02-26 PROCEDURE — 1159F MED LIST DOCD IN RCRD: CPT | Performed by: FAMILY MEDICINE

## 2024-02-26 PROCEDURE — 36415 COLL VENOUS BLD VENIPUNCTURE: CPT

## 2024-02-26 PROCEDURE — 3078F DIAST BP <80 MM HG: CPT | Performed by: FAMILY MEDICINE

## 2024-02-26 RX ORDER — FUROSEMIDE 40 MG/1
60 TABLET ORAL DAILY
Qty: 45 TABLET | Refills: 1 | Status: SHIPPED | OUTPATIENT
Start: 2024-02-26 | End: 2024-03-26 | Stop reason: DRUGHIGH

## 2024-02-26 NOTE — PROGRESS NOTES
Subjective   Patient ID: Jero Aguilar is a 83 y.o. male who presents for Follow-up (For foot/leg swelling. Has improved slightly, maybe 40% better. If continuing furosemide he will need refills. ).    HPI   Patient comes in today with his wife for reevaluation of the swelling in his feet and ankles.  He continues to have 2-3+ pitting edema in his feet and ankles.  His weight is exactly the same today as it was 3 weeks ago.  He is approximately 10 pounds over where he should should be ideally.  His wife says he is eating better and he is now sleeping through the night without having to get up to go to the bathroom.    2/5/2024  Patient comes in today with his wife for reevaluation of the leg and foot swelling and weeping of the skin.  It has improved significantly since last week.  Patient has lost 4 pounds with the Lasix at 40 mg daily.  He is still up 10 pounds from 1/3/2024.    Patient saw Dr. Peterson this morning and he ordered labs but did not change any of his medicines.    1/29/2024  Patient comes in today with his wife for evaluation of leg and foot swelling and weeping of the skin.  She states it has been going on for several weeks.  She just started him on Lasix 10 mg daily a few days ago and so far she has not noticed any difference.      He did fall this past Sunday and he has ecchymosis and bruising of his right ear.  There was apparently an unwitnessed fall with his wife finding him down just a few seconds after it happened.  Patient states he did not lose consciousness and blames it on the weakness in his legs.  He does wear hearing aids and apparently struck the right ear with the hearing aid behind it causing the bruising.    1/3/2024  Patient comes in today with his wife Katie for follow-up from recent ER visit to [facility].  He had a fall in his basement on 12/21/2023 where apparently he lost his balance between the stairs and his walker and he fell backward and hit his head.  His wife  came down the basement steps and found him pretty much immediately.  He had not lost consciousness.  He just so happened that his daughter was coming to visit at the same time and she called 911 and the patient was transported to [facility] because it is a trauma center.  They are physically closer to [facility].    He was fully evaluated in the ER and 2 disposable sutures were placed.  It was recommended that he stay in the hospital for further evaluation and the patient and his wife declined.  He was determined to be stable for discharge, treated and released with instructions to follow-up here.    Patient also apparently just saw Dr. Tillman and apparently there was some disconnect with the family and the doctor as to why they were there.  I had sent him there because of his significant weight loss.  He has lost almost 30 pounds since January of last year.  He had an abnormal esophagram on 12/4/2023.  He apparently was seen by the doctor and instructed to come back in 3 months.    11/29/2023  Patient comes in today with his wife Katie who continues to be frustrated over his lack of appetite.  He has lost 25 pounds since January 2023.  He had gained back 4 pounds in October however today he is down those 4 pounds again.  He had an EGD in March 2023 which showed gastric and antral erosions and he continues to take the Protonix 40 mg daily.  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    He also continues to have a very dry mouth which is likely due to the medications he is on.    His mental status is also not good and his wife is requesting to see psych for both him and herself.     Review of Systems   All other systems reviewed and are negative.      Objective   /69   Pulse 79   Temp 36.1 °C (96.9 °F)   Resp 20   Wt 71.7 kg (158 lb)   SpO2 95%   BMI 24.75 kg/m²     Physical Exam  Vitals reviewed.    Constitutional:       Appearance: He is well-developed.   HENT:      Head: Normocephalic and atraumatic.      Right Ear: Tympanic membrane, ear canal and external ear normal.      Left Ear: Tympanic membrane, ear canal and external ear normal.      Nose: Nose normal.      Mouth/Throat:      Mouth: Mucous membranes are moist.      Pharynx: Oropharynx is clear.   Eyes:      Extraocular Movements: Extraocular movements intact.      Conjunctiva/sclera: Conjunctivae normal.      Pupils: Pupils are equal, round, and reactive to light.   Cardiovascular:      Rate and Rhythm: Normal rate and regular rhythm.      Heart sounds: Normal heart sounds. No murmur heard.  Pulmonary:      Effort: Pulmonary effort is normal.      Breath sounds: Normal breath sounds. No wheezing.   Abdominal:      General: Abdomen is flat. Bowel sounds are normal.      Palpations: Abdomen is soft.   Musculoskeletal:         General: Normal range of motion.      Right lower leg: Edema (1-2+ pitting edema) present.      Left lower leg: Edema (1-2+ pitting edema) present.   Skin:     General: Skin is warm and dry.   Neurological:      General: No focal deficit present.      Mental Status: He is alert and oriented to person, place, and time. Mental status is at baseline.   Psychiatric:         Mood and Affect: Mood normal.         Behavior: Behavior normal.         Thought Content: Thought content normal.         Judgment: Judgment normal.       Assessment/Plan   Problem List Items Addressed This Visit             ICD-10-CM    Edema R60.9    Relevant Medications    furosemide (Lasix) 40 mg tablet    Other Relevant Orders    Basic Metabolic Panel (Completed)    Follow Up In [facility] - PCP - Established    Bilateral leg weakness R29.898    Dementia (CMS/HCC) - Primary F03.90    Unexplained weight loss R63.4    Difficulty in walking, not elsewhere classified R26.2    Memory loss R41.3   Increase furosemide to 60 mg daily.  Check BMP  today.  Take new dose of medication as directed.  Continue other medications as directed.  RTC in one month for recheck, sooner should problems arise.  Medication list reconciled.  Thank you for visiting today!      Professional services: Some of this note was completed using Dragon voice technology and sometimes the software misinterprets words. This may include unintended errors with respect to translation of words, typographical errors or grammar errors which may not have been identified prior to finalization of the chart note. Please take this into account when reading the note. Thank you.

## 2024-03-01 ENCOUNTER — TELEPHONE (OUTPATIENT)
Dept: PRIMARY CARE | Facility: CLINIC | Age: 84
End: 2024-03-01
Payer: MEDICARE

## 2024-03-01 NOTE — TELEPHONE ENCOUNTER
----- Message from Dameon Iniguez DO sent at 2/28/2024  7:41 AM EST -----  Regarding: Labs  Please notify [place] wife Katie of good potassium level so continue the current dose of potassium daily.    The kidney function has decreased slightly, she should try and make sure he is drinking 32 ounces of water a day.  Will recheck it again next month at his next visit.  ----- Message -----  From: Lab, Background User  Sent: 2/26/2024   4:06 PM EST  To: Dameon Iniguez DO

## 2024-03-13 ENCOUNTER — PATIENT OUTREACH (OUTPATIENT)
Dept: PRIMARY CARE | Facility: CLINIC | Age: 84
End: 2024-03-13
Payer: MEDICARE

## 2024-03-26 ENCOUNTER — OFFICE VISIT (OUTPATIENT)
Dept: PRIMARY CARE | Facility: CLINIC | Age: 84
End: 2024-03-26
Payer: MEDICARE

## 2024-03-26 ENCOUNTER — LAB (OUTPATIENT)
Dept: LAB | Facility: LAB | Age: 84
End: 2024-03-26
Payer: MEDICARE

## 2024-03-26 VITALS
WEIGHT: 143 LBS | DIASTOLIC BLOOD PRESSURE: 66 MMHG | RESPIRATION RATE: 16 BRPM | BODY MASS INDEX: 22.4 KG/M2 | OXYGEN SATURATION: 98 % | SYSTOLIC BLOOD PRESSURE: 107 MMHG | TEMPERATURE: 97.8 F | HEART RATE: 78 BPM

## 2024-03-26 DIAGNOSIS — F32.A DEPRESSION, UNSPECIFIED DEPRESSION TYPE: ICD-10-CM

## 2024-03-26 DIAGNOSIS — J20.9 ACUTE BRONCHITIS, UNSPECIFIED ORGANISM: ICD-10-CM

## 2024-03-26 DIAGNOSIS — R60.9 EDEMA, UNSPECIFIED TYPE: ICD-10-CM

## 2024-03-26 DIAGNOSIS — R63.4 EXCESSIVE WEIGHT LOSS: ICD-10-CM

## 2024-03-26 DIAGNOSIS — H00.12 CHALAZION OF RIGHT LOWER EYELID: Primary | ICD-10-CM

## 2024-03-26 LAB
ANION GAP SERPL CALC-SCNC: 14 MMOL/L (ref 10–20)
BUN SERPL-MCNC: 54 MG/DL (ref 6–23)
CALCIUM SERPL-MCNC: 9 MG/DL (ref 8.6–10.3)
CHLORIDE SERPL-SCNC: 106 MMOL/L (ref 98–107)
CO2 SERPL-SCNC: 27 MMOL/L (ref 21–32)
CREAT SERPL-MCNC: 2.3 MG/DL (ref 0.5–1.3)
EGFRCR SERPLBLD CKD-EPI 2021: 27 ML/MIN/1.73M*2
GLUCOSE SERPL-MCNC: 92 MG/DL (ref 74–99)
POTASSIUM SERPL-SCNC: 4 MMOL/L (ref 3.5–5.3)
SODIUM SERPL-SCNC: 143 MMOL/L (ref 136–145)

## 2024-03-26 PROCEDURE — 99214 OFFICE O/P EST MOD 30 MIN: CPT | Performed by: FAMILY MEDICINE

## 2024-03-26 PROCEDURE — 80048 BASIC METABOLIC PNL TOTAL CA: CPT

## 2024-03-26 PROCEDURE — 3074F SYST BP LT 130 MM HG: CPT | Performed by: FAMILY MEDICINE

## 2024-03-26 PROCEDURE — 1159F MED LIST DOCD IN RCRD: CPT | Performed by: FAMILY MEDICINE

## 2024-03-26 PROCEDURE — 3078F DIAST BP <80 MM HG: CPT | Performed by: FAMILY MEDICINE

## 2024-03-26 PROCEDURE — 36415 COLL VENOUS BLD VENIPUNCTURE: CPT

## 2024-03-26 RX ORDER — FUROSEMIDE 40 MG/1
40 TABLET ORAL DAILY
Qty: 45 TABLET | Refills: 1 | Status: SHIPPED | OUTPATIENT
Start: 2024-03-26 | End: 2024-05-14 | Stop reason: SDUPTHER

## 2024-03-26 RX ORDER — MIRTAZAPINE 7.5 MG/1
7.5 TABLET, FILM COATED ORAL NIGHTLY
Qty: 30 TABLET | Refills: 1 | Status: SHIPPED | OUTPATIENT
Start: 2024-03-26 | End: 2024-05-28 | Stop reason: SDUPTHER

## 2024-03-26 NOTE — PROGRESS NOTES
Subjective   Patient ID: Jero Aguilar is a 83 y.o. male who presents for Follow-up (For furosemide. Swelling has reduced greatly./Questions aboutrecent coughing and frequent napping. ).    HPI  Here today with his wife complaining of a dry hacking cough.  His wife also reports that he is napping frequently.  He is here today for follow-up on his leg edema as well.  He has a lesion on his right lower eyelid that is not going away.      Review of Systems   All other systems reviewed and are negative.      Objective   Vitals:  /66   Pulse 78   Temp 36.6 °C (97.8 °F)   Resp 16   Wt 64.9 kg (143 lb)   SpO2 98%   BMI 22.40 kg/m²     Physical Exam  Constitutional:       Appearance: He is well-developed.   HENT:      Head: Normocephalic and atraumatic.      Right Ear: Tympanic membrane, ear canal and external ear normal.      Left Ear: Tympanic membrane, ear canal and external ear normal.      Nose: Nose normal.      Mouth/Throat:      Mouth: Mucous membranes are moist.      Pharynx: Oropharynx is clear.   Eyes:      Extraocular Movements: Extraocular movements intact.      Conjunctiva/sclera: Conjunctivae normal.      Pupils: Pupils are equal, round, and reactive to light.      Comments: Papular lesion right lower lid   Cardiovascular:      Rate and Rhythm: Normal rate and regular rhythm.      Heart sounds: Normal heart sounds. No murmur heard.  Pulmonary:      Effort: Pulmonary effort is normal.      Breath sounds: Rhonchi (Dry hacking cough) present. No wheezing.   Abdominal:      General: Abdomen is flat. Bowel sounds are normal.      Palpations: Abdomen is soft.   Musculoskeletal:         General: Normal range of motion.      Right lower leg: Edema (1+ edema) present.      Left lower leg: Edema (1+ edema) present.   Skin:     General: Skin is warm and dry.   Neurological:      General: No focal deficit present.      Mental Status: He is alert and oriented to person, place, and time. Mental status is at  baseline.   Psychiatric:         Mood and Affect: Mood normal.         Behavior: Behavior normal.         Thought Content: Thought content normal.         Judgment: Judgment normal.       Assessment/Plan   Problem List Items Addressed This Visit       Edema    Overview     Continue the Lasix but decrease to 40 mg         Relevant Medications    furosemide (Lasix) 40 mg tablet    Other Relevant Orders    Basic Metabolic Panel (Completed)    Follow Up In [facility] - PCP - Medicare Annual    Excessive weight loss    Overview     Initially due to poor appetite now also due to Lasix         Relevant Medications    mirtazapine (Remeron) 7.5 mg tablet    Depression    Overview     Seems to be improving patient's appetite as well as his depression         Relevant Medications    mirtazapine (Remeron) 7.5 mg tablet     Other Visit Diagnoses       Chalazion of right lower eyelid    -  Primary    Relevant Medications    sulfacetamide (Bleph-10) 10 % ophthalmic solution    Acute bronchitis, unspecified organism        Relevant Medications    azithromycin (Zithromax) 250 mg tablet        Patient's weight has fluctuated significantly since January from not only not eating but also from edema in his lower extremities.  Today his weight is down actually 5 pounds beneath where he was back in January but he now has very little swelling in either lower extremity.  His wife does report that he is eating better with the mirtazapine.  He also seems to be improving some with his depression.  We will continue the mirtazapine for now.    Use meds as directed.  Return to clinic if symptoms do not improve in 10-14 days.    Should the condition worsen contact me and return here or if after hours seek further evaluation at an urgent care or in the emergency room.  Medication list reconciled.  Thank you for visiting today!      Professional services: Some of this note was completed using Dragon voice technology and sometimes the  software misinterprets words. This may include unintended errors with respect to translation of words, typographical errors or grammar errors which may not have been identified prior to finalization of the chart note. Please take this into account when reading the note. Thank you.                 Dameon Iniguez DO 03/30/24 11:48 AM

## 2024-03-29 NOTE — RESULT ENCOUNTER NOTE
Please notify patient of stable labs.  Continue current medicines and will recheck in June as planned

## 2024-03-30 PROBLEM — F32.A DEPRESSION: Status: ACTIVE | Noted: 2024-03-30

## 2024-03-30 RX ORDER — SULFACETAMIDE SODIUM 100 MG/ML
2 SOLUTION/ DROPS OPHTHALMIC 4 TIMES DAILY
Qty: 15 ML | Refills: 0 | Status: SHIPPED | OUTPATIENT
Start: 2024-03-30 | End: 2024-04-06

## 2024-03-30 RX ORDER — AZITHROMYCIN 250 MG/1
TABLET, FILM COATED ORAL
Qty: 6 TABLET | Refills: 0 | Status: SHIPPED | OUTPATIENT
Start: 2024-03-30 | End: 2024-04-03

## 2024-05-13 DIAGNOSIS — R60.9 EDEMA, UNSPECIFIED TYPE: ICD-10-CM

## 2024-05-13 NOTE — TELEPHONE ENCOUNTER
Rx Refill Request Telephone Encounter    Name:  Jero Aguilar  :  [id number]  Medication Name:  furosemide (Lasix) 40 mg tablet         Specific Pharmacy location:    [id number] [address] Phone: [phone]   Fax: [phone]        Date of last appointment:  24  Date of next appointment:  24  Best number to reach patient:  [phone]        Thank You

## 2024-05-14 RX ORDER — FUROSEMIDE 40 MG/1
40 TABLET ORAL DAILY
Qty: 90 TABLET | Refills: 1 | Status: SHIPPED | OUTPATIENT
Start: 2024-05-14 | End: 2024-11-10

## 2024-05-14 NOTE — TELEPHONE ENCOUNTER
Requested Prescriptions     Pending Prescriptions Disp Refills    furosemide (Lasix) 40 mg tablet 90 tablet 1     Sig: Take 1 tablet (40 mg) by mouth once daily.

## 2024-05-28 DIAGNOSIS — R63.4 EXCESSIVE WEIGHT LOSS: ICD-10-CM

## 2024-05-28 DIAGNOSIS — F32.A DEPRESSION, UNSPECIFIED DEPRESSION TYPE: ICD-10-CM

## 2024-05-28 RX ORDER — MIRTAZAPINE 7.5 MG/1
7.5 TABLET, FILM COATED ORAL NIGHTLY
Qty: 30 TABLET | Refills: 5 | Status: SHIPPED | OUTPATIENT
Start: 2024-05-28 | End: 2024-11-24

## 2024-05-28 RX ORDER — MIRTAZAPINE 7.5 MG/1
7.5 TABLET, FILM COATED ORAL NIGHTLY
Qty: 30 TABLET | Refills: 1 | OUTPATIENT
Start: 2024-05-28 | End: 2024-08-26

## 2024-05-28 NOTE — TELEPHONE ENCOUNTER
Requested Prescriptions     Pending Prescriptions Disp Refills    mirtazapine (Remeron) 7.5 mg tablet 30 tablet 1     Sig: Take 1 tablet (7.5 mg) by mouth once daily at bedtime.

## 2024-05-28 NOTE — TELEPHONE ENCOUNTER
Rx Refill Request Telephone Encounter    Name:  Jero Aguilar  :  [id number]  Medication Name:  mirtazapine (Remeron) 7.5 mg tablet       Specific Pharmacy location:    [id number] [address] [facility] Phone: [phone]   Fax: [phone]        Date of last appointment:  24  Date of next appointment:  24  Best number to reach patient:  [phone]        Thank You

## 2024-06-06 ENCOUNTER — APPOINTMENT (OUTPATIENT)
Dept: PRIMARY CARE | Facility: CLINIC | Age: 84
End: 2024-06-06
Payer: MEDICARE

## 2024-06-10 DIAGNOSIS — E78.5 HYPERLIPIDEMIA, UNSPECIFIED HYPERLIPIDEMIA TYPE: ICD-10-CM

## 2024-06-10 NOTE — TELEPHONE ENCOUNTER
Rx Refill Request Telephone Encounter    Name:  Jero Aguilar  :  [id number]  Medication Name:  simvastatin (Zocor) 40 mg tablet       Specific Pharmacy location:    [id number] [address] Phone: [phone]   Fax: [phone]        Date of last appointment:  24  Date of next appointment:  24  Best number to reach patient:  [phone]        Thank You

## 2024-06-11 RX ORDER — SIMVASTATIN 40 MG/1
40 TABLET, FILM COATED ORAL NIGHTLY
Qty: 90 TABLET | Refills: 1 | Status: SHIPPED | OUTPATIENT
Start: 2024-06-11

## 2024-06-14 ENCOUNTER — LAB (OUTPATIENT)
Dept: LAB | Facility: LAB | Age: 84
End: 2024-06-14
Payer: MEDICARE

## 2024-06-14 ENCOUNTER — APPOINTMENT (OUTPATIENT)
Dept: PRIMARY CARE | Facility: CLINIC | Age: 84
End: 2024-06-14
Payer: MEDICARE

## 2024-06-14 VITALS
DIASTOLIC BLOOD PRESSURE: 77 MMHG | SYSTOLIC BLOOD PRESSURE: 121 MMHG | RESPIRATION RATE: 19 BRPM | HEART RATE: 76 BPM | HEIGHT: 67 IN | BODY MASS INDEX: 24.96 KG/M2 | TEMPERATURE: 98 F | OXYGEN SATURATION: 98 % | WEIGHT: 159 LBS

## 2024-06-14 DIAGNOSIS — N40.0 BENIGN PROSTATIC HYPERPLASIA, UNSPECIFIED WHETHER LOWER URINARY TRACT SYMPTOMS PRESENT: ICD-10-CM

## 2024-06-14 DIAGNOSIS — E53.8 VITAMIN B 12 DEFICIENCY: ICD-10-CM

## 2024-06-14 DIAGNOSIS — E55.9 VITAMIN D DEFICIENCY: ICD-10-CM

## 2024-06-14 DIAGNOSIS — I10 HYPERTENSION, UNSPECIFIED TYPE: ICD-10-CM

## 2024-06-14 DIAGNOSIS — Z00.00 ROUTINE GENERAL MEDICAL EXAMINATION AT HEALTH CARE FACILITY: Primary | ICD-10-CM

## 2024-06-14 DIAGNOSIS — R41.3 MEMORY LOSS: ICD-10-CM

## 2024-06-14 DIAGNOSIS — R29.898 BILATERAL LEG WEAKNESS: ICD-10-CM

## 2024-06-14 DIAGNOSIS — M15.9 GENERALIZED OSTEOARTHRITIS OF MULTIPLE SITES: ICD-10-CM

## 2024-06-14 DIAGNOSIS — R60.9 EDEMA, UNSPECIFIED TYPE: ICD-10-CM

## 2024-06-14 LAB
25(OH)D3 SERPL-MCNC: 52 NG/ML (ref 30–100)
ALBUMIN SERPL BCP-MCNC: 4 G/DL (ref 3.4–5)
ALP SERPL-CCNC: 67 U/L (ref 33–136)
ALT SERPL W P-5'-P-CCNC: 24 U/L (ref 10–52)
ANION GAP SERPL CALC-SCNC: 12 MMOL/L (ref 10–20)
AST SERPL W P-5'-P-CCNC: 24 U/L (ref 9–39)
BILIRUB SERPL-MCNC: 0.7 MG/DL (ref 0–1.2)
BUN SERPL-MCNC: 43 MG/DL (ref 6–23)
CALCIUM SERPL-MCNC: 9.1 MG/DL (ref 8.6–10.3)
CHLORIDE SERPL-SCNC: 105 MMOL/L (ref 98–107)
CO2 SERPL-SCNC: 28 MMOL/L (ref 21–32)
CREAT SERPL-MCNC: 2.43 MG/DL (ref 0.5–1.3)
EGFRCR SERPLBLD CKD-EPI 2021: 25 ML/MIN/1.73M*2
ERYTHROCYTE [DISTWIDTH] IN BLOOD BY AUTOMATED COUNT: 14.5 % (ref 11.5–14.5)
GLUCOSE SERPL-MCNC: 101 MG/DL (ref 74–99)
HCT VFR BLD AUTO: 38.6 % (ref 41–52)
HGB BLD-MCNC: 12.7 G/DL (ref 13.5–17.5)
MCH RBC QN AUTO: 30.1 PG (ref 26–34)
MCHC RBC AUTO-ENTMCNC: 32.9 G/DL (ref 32–36)
MCV RBC AUTO: 92 FL (ref 80–100)
NRBC BLD-RTO: 0 /100 WBCS (ref 0–0)
PLATELET # BLD AUTO: 150 X10*3/UL (ref 150–450)
POTASSIUM SERPL-SCNC: 4.6 MMOL/L (ref 3.5–5.3)
PROT SERPL-MCNC: 6.7 G/DL (ref 6.4–8.2)
PSA SERPL-MCNC: 9.46 NG/ML
RBC # BLD AUTO: 4.22 X10*6/UL (ref 4.5–5.9)
SODIUM SERPL-SCNC: 140 MMOL/L (ref 136–145)
VIT B12 SERPL-MCNC: 455 PG/ML (ref 211–911)
WBC # BLD AUTO: 5.5 X10*3/UL (ref 4.4–11.3)

## 2024-06-14 PROCEDURE — G0439 PPPS, SUBSEQ VISIT: HCPCS | Performed by: FAMILY MEDICINE

## 2024-06-14 PROCEDURE — 84153 ASSAY OF PSA TOTAL: CPT

## 2024-06-14 PROCEDURE — 99397 PER PM REEVAL EST PAT 65+ YR: CPT | Performed by: FAMILY MEDICINE

## 2024-06-14 PROCEDURE — 36415 COLL VENOUS BLD VENIPUNCTURE: CPT

## 2024-06-14 PROCEDURE — 80053 COMPREHEN METABOLIC PANEL: CPT

## 2024-06-14 PROCEDURE — 85027 COMPLETE CBC AUTOMATED: CPT

## 2024-06-14 PROCEDURE — 82607 VITAMIN B-12: CPT

## 2024-06-14 PROCEDURE — 82306 VITAMIN D 25 HYDROXY: CPT

## 2024-06-14 ASSESSMENT — ACTIVITIES OF DAILY LIVING (ADL)
BATHING: INDEPENDENT
MANAGING_FINANCES: NEEDS ASSISTANCE
GROCERY_SHOPPING: NEEDS ASSISTANCE
TAKING_MEDICATION: INDEPENDENT
DRESSING: NEEDS ASSISTANCE
DOING_HOUSEWORK: NEEDS ASSISTANCE

## 2024-06-14 ASSESSMENT — PATIENT HEALTH QUESTIONNAIRE - PHQ9
2. FEELING DOWN, DEPRESSED OR HOPELESS: NOT AT ALL
SUM OF ALL RESPONSES TO PHQ9 QUESTIONS 1 AND 2: 0
1. LITTLE INTEREST OR PLEASURE IN DOING THINGS: NOT AT ALL

## 2024-06-14 NOTE — PROGRESS NOTES
"Subjective   Reason for Visit: Jero Aguilar is an 83 y.o. male here for a Medicare Wellness visit.     Past Medical, Surgical, and Family History reviewed and updated in chart.    Reviewed all medications by prescribing practitioner or clinical pharmacist (such as prescriptions, OTCs, herbal therapies and supplements) and documented in the medical record.    HPI  Patient comes in today with his wife Katie for Medicare wellness exam.  She reports that patient is constipated at times.  When asked further the patient states his bowels are normal it is just the time in between movements.  He has been using MiraLAX and that is successful they are just questioning how often they can use it.    Patient also admits that he is not eating.  He just does not have much of an appetite.  He has however regained weight going from 143 in March to now 159 today.  This is apparently good weight because there is no evidence of leg swelling in either extremity.    Patient's wife also reports that his memory loss seems to be slowly getting worse.  Having said that she does state that he does do crossword puzzles and the other day played Jeffrey with the family.  She also has him doing coloring books and other activities to try and keep his brain active.    Patient's blood pressure continues to remain good off the metoprolol medicine.  He was quite hypotensive when on the medication and seems to be doing well without it.  Will continue to monitor.    Patient's PHQ-9 score is 7 which his wife describes as somewhat difficult      Patient Care Team:  Dameon Iniguez DO as PCP - General  Hernesto Almeida DO as PCP - [employer] PCP  Mehrdad Nicholson MD as Consulting Physician (Cardiology)     Review of Systems   All other systems reviewed and are negative.      Objective   Vitals:  /77   Pulse 76   Temp 36.7 °C (98 °F)   Resp 19   Ht 1.702 m (5' 7\")   Wt 72.1 kg (159 lb)   SpO2 98%   BMI 24.90 kg/m²       Physical " Exam  Vitals reviewed.   Constitutional:       Appearance: He is well-developed.      Comments: Patient is in wheelchair for transport he does apparently do limited walking at home.   HENT:      Head: Normocephalic and atraumatic.      Right Ear: Tympanic membrane, ear canal and external ear normal.      Left Ear: Tympanic membrane, ear canal and external ear normal.      Nose: Nose normal.      Mouth/Throat:      Mouth: Mucous membranes are moist.      Pharynx: Oropharynx is clear.   Eyes:      Extraocular Movements: Extraocular movements intact.      Conjunctiva/sclera: Conjunctivae normal.      Pupils: Pupils are equal, round, and reactive to light.      Comments: Patient wears glasses   Cardiovascular:      Rate and Rhythm: Normal rate and regular rhythm.      Heart sounds: Normal heart sounds. No murmur heard.  Pulmonary:      Effort: Pulmonary effort is normal.      Breath sounds: Normal breath sounds. No wheezing.   Abdominal:      General: Abdomen is flat. Bowel sounds are normal.      Palpations: Abdomen is soft.   Musculoskeletal:         General: Normal range of motion.   Skin:     General: Skin is warm and dry.   Neurological:      General: No focal deficit present.      Mental Status: He is alert and oriented to person, place, and time. Mental status is at baseline.   Psychiatric:         Mood and Affect: Mood normal.         Behavior: Behavior normal.         Thought Content: Thought content normal.         Judgment: Judgment normal.         Assessment/Plan   Problem List Items Addressed This Visit       Edema    Overview     Continue the Lasix 40 mg daily         Generalized osteoarthritis of multiple sites    Bilateral leg weakness    Vitamin B 12 deficiency    Relevant Orders    CBC    Vitamin B12    Vitamin D deficiency    Relevant Orders    Vitamin D 25-Hydroxy,Total (for eval of Vitamin D levels)    Memory loss    Benign prostatic hyperplasia    Relevant Orders    Prostate Specific Antigen      Other Visit Diagnoses       Routine general medical examination at health care facility    -  Primary    Hypertension, unspecified type        Relevant Orders    Comprehensive Metabolic Panel        Will contact patient with lab results when available.  Continue current meds as directed.  Follow up in 3 months for recheck if all remains stable, sooner if problems arise.  Medication list reconciled.  Thank you for visiting today!      Professional services: Some of this note was completed using Dragon voice technology and sometimes the software misinterprets words. This may include unintended errors with respect to translation of words, typographical errors or grammar errors which may not have been identified prior to finalization of the chart note. Please take this into account when reading the note. Thank you.

## 2024-06-17 ENCOUNTER — TELEPHONE (OUTPATIENT)
Dept: PRIMARY CARE | Facility: CLINIC | Age: 84
End: 2024-06-17
Payer: MEDICARE

## 2024-06-17 NOTE — TELEPHONE ENCOUNTER
----- Message from Dameon Iniguez DO sent at 6/17/2024  7:29 AM EDT -----  Please notify patient's wife that his PSA has gone up to 9.46.  It should not be higher than 4.0.  He should follow-up with his urologist.     His kidney function is about the same as it was 3 months ago.    His hemoglobin has improved from 9.6 and is back up to almost normal at 12.7.    His vitamin D and B12 are in normal range.

## 2024-07-08 DIAGNOSIS — K21.9 GASTROESOPHAGEAL REFLUX DISEASE WITHOUT ESOPHAGITIS: ICD-10-CM

## 2024-07-08 NOTE — TELEPHONE ENCOUNTER
Patient requests prescription below    Last Office Visit: 06/14/2024  Next Office Visit:     Requested Prescriptions     Pending Prescriptions Disp Refills    pantoprazole (ProtoNix) 40 mg EC tablet 90 tablet 1     Sig: Take 1 tablet (40 mg) by mouth once daily. Do not crush, chew, or split.

## 2024-07-09 RX ORDER — PANTOPRAZOLE SODIUM 40 MG/1
40 TABLET, DELAYED RELEASE ORAL DAILY
Qty: 90 TABLET | Refills: 1 | Status: SHIPPED | OUTPATIENT
Start: 2024-07-09 | End: 2025-01-05

## 2024-07-15 DIAGNOSIS — E87.6 HYPOKALEMIA: ICD-10-CM

## 2024-07-15 RX ORDER — POTASSIUM CHLORIDE 20 MEQ/1
20 TABLET, EXTENDED RELEASE ORAL DAILY
Qty: 90 TABLET | Refills: 1 | Status: SHIPPED | OUTPATIENT
Start: 2024-07-15 | End: 2025-01-11

## 2024-07-15 NOTE — TELEPHONE ENCOUNTER
Rx Refill Request Telephone Encounter    Name:  Jero Aguilar  :  [id number]  Medication Name:  Potassium Chloride 20 mg        Specific Pharmacy location:    [id number] [address] [facility] Phone: [phone]   Fax: [phone]        Date of last appointment:  24  Date of next appointment:    Best number to reach patient:  [phone]      Thank You

## 2024-07-15 NOTE — TELEPHONE ENCOUNTER
Requested Prescriptions     Pending Prescriptions Disp Refills    potassium chloride CR 20 mEq ER tablet 90 tablet 1     Sig: Take 1 tablet (20 mEq) by mouth once daily. Do not crush, chew, or split.

## 2024-07-26 ENCOUNTER — TELEPHONE (OUTPATIENT)
Dept: PRIMARY CARE | Facility: CLINIC | Age: 84
End: 2024-07-26
Payer: MEDICARE

## 2024-07-26 DIAGNOSIS — M15.9 GENERALIZED OSTEOARTHRITIS OF MULTIPLE SITES: Primary | ICD-10-CM

## 2024-07-26 NOTE — TELEPHONE ENCOUNTER
Patient's wife Cyndi called in and she is asking for a renewal on the his handicap placard.  Patient can be reached at [phone].        Thank You

## 2024-08-01 ENCOUNTER — HOSPITAL ENCOUNTER (OUTPATIENT)
Dept: RADIOLOGY | Facility: CLINIC | Age: 84
Discharge: HOME | End: 2024-08-01
Payer: MEDICARE

## 2024-08-01 DIAGNOSIS — C61 MALIGNANT NEOPLASM OF PROSTATE (MULTI): ICD-10-CM

## 2024-08-01 DIAGNOSIS — R97.20 ELEVATED PROSTATE SPECIFIC ANTIGEN (PSA): ICD-10-CM

## 2024-08-01 PROCEDURE — 78815 PET IMAGE W/CT SKULL-THIGH: CPT | Mod: PET TUMOR SUBSQ TX STRATEGY | Performed by: STUDENT IN AN ORGANIZED HEALTH CARE EDUCATION/TRAINING PROGRAM

## 2024-08-01 PROCEDURE — 78815 PET IMAGE W/CT SKULL-THIGH: CPT | Mod: PS

## 2024-08-01 PROCEDURE — 3430000001 HC RX 343 DIAGNOSTIC RADIOPHARMACEUTICALS: Performed by: NUCLEAR MEDICINE

## 2024-08-01 PROCEDURE — A9596 HC RX 343 DIAGNOSTIC RADIOPHARMACEUTICALS: HCPCS | Performed by: NUCLEAR MEDICINE

## 2024-10-03 PROBLEM — Z86.69 HISTORY OF HEARING LOSS: Status: ACTIVE | Noted: 2024-10-03

## 2024-10-23 ENCOUNTER — HOSPITAL ENCOUNTER (OUTPATIENT)
Dept: CARDIOLOGY | Facility: CLINIC | Age: 84
Discharge: HOME | End: 2024-10-23
Payer: MEDICARE

## 2024-10-23 ENCOUNTER — APPOINTMENT (OUTPATIENT)
Dept: CARDIOLOGY | Facility: CLINIC | Age: 84
End: 2024-10-23
Payer: MEDICARE

## 2024-10-23 VITALS
WEIGHT: 162 LBS | HEIGHT: 67 IN | DIASTOLIC BLOOD PRESSURE: 80 MMHG | BODY MASS INDEX: 25.43 KG/M2 | OXYGEN SATURATION: 98 % | SYSTOLIC BLOOD PRESSURE: 130 MMHG | HEART RATE: 85 BPM

## 2024-10-23 VITALS
BODY MASS INDEX: 24.96 KG/M2 | SYSTOLIC BLOOD PRESSURE: 121 MMHG | WEIGHT: 159 LBS | DIASTOLIC BLOOD PRESSURE: 77 MMHG | HEIGHT: 67 IN

## 2024-10-23 DIAGNOSIS — I35.1 MILD AORTIC INSUFFICIENCY: ICD-10-CM

## 2024-10-23 DIAGNOSIS — I42.2 PRIMARY HYPERTROPHIC CARDIOMYOPATHY (MULTI): ICD-10-CM

## 2024-10-23 DIAGNOSIS — I48.91 ATRIAL FIBRILLATION, UNSPECIFIED TYPE (MULTI): Primary | ICD-10-CM

## 2024-10-23 DIAGNOSIS — I42.1 OBSTRUCTIVE HYPERTROPHIC CARDIOMYOPATHY (MULTI): ICD-10-CM

## 2024-10-23 DIAGNOSIS — I34.0 NONRHEUMATIC MITRAL VALVE REGURGITATION: ICD-10-CM

## 2024-10-23 DIAGNOSIS — I48.91 ATRIAL FIBRILLATION, UNSPECIFIED TYPE (MULTI): ICD-10-CM

## 2024-10-23 LAB
EJECTION FRACTION APICAL 4 CHAMBER: 74.3
EJECTION FRACTION: 78 %
LEFT ATRIUM VOLUME AREA LENGTH INDEX BSA: 24.8 ML/M2
LEFT VENTRICLE INTERNAL DIMENSION DIASTOLE: 2.94 CM (ref 3.5–6)
LEFT VENTRICULAR OUTFLOW TRACT DIAMETER: 2.32 CM
Q ONSET: 220 MS
QRS COUNT: 15 BEATS
QRS DURATION: 80 MS
QT INTERVAL: 394 MS
QTC CALCULATION(BAZETT): 479 MS
QTC FREDERICIA: 449 MS
R AXIS: -54 DEGREES
RIGHT VENTRICLE PEAK SYSTOLIC PRESSURE: 25.9 MMHG
T AXIS: 87 DEGREES
T OFFSET: 417 MS
VENTRICULAR RATE: 89 BPM

## 2024-10-23 PROCEDURE — 1159F MED LIST DOCD IN RCRD: CPT | Performed by: INTERNAL MEDICINE

## 2024-10-23 PROCEDURE — 3079F DIAST BP 80-89 MM HG: CPT | Performed by: INTERNAL MEDICINE

## 2024-10-23 PROCEDURE — 99215 OFFICE O/P EST HI 40 MIN: CPT | Mod: 25 | Performed by: INTERNAL MEDICINE

## 2024-10-23 PROCEDURE — 93306 TTE W/DOPPLER COMPLETE: CPT | Performed by: INTERNAL MEDICINE

## 2024-10-23 PROCEDURE — 3075F SYST BP GE 130 - 139MM HG: CPT | Performed by: INTERNAL MEDICINE

## 2024-10-23 PROCEDURE — 1123F ACP DISCUSS/DSCN MKR DOCD: CPT | Performed by: INTERNAL MEDICINE

## 2024-10-23 PROCEDURE — 93306 TTE W/DOPPLER COMPLETE: CPT

## 2024-10-23 PROCEDURE — 2500000004 HC RX 250 GENERAL PHARMACY W/ HCPCS (ALT 636 FOR OP/ED): Performed by: INTERNAL MEDICINE

## 2024-10-23 PROCEDURE — 93010 ELECTROCARDIOGRAM REPORT: CPT | Performed by: INTERNAL MEDICINE

## 2024-10-23 PROCEDURE — 93005 ELECTROCARDIOGRAM TRACING: CPT | Performed by: INTERNAL MEDICINE

## 2024-10-23 PROCEDURE — 99215 OFFICE O/P EST HI 40 MIN: CPT | Performed by: INTERNAL MEDICINE

## 2024-10-23 NOTE — ASSESSMENT & PLAN NOTE
10/23/24 echocardiogram moderate to severe concentric LVH with LVEF = 75-80% with Valsalva LVOT gradient 24 (reviewed today)

## 2024-10-23 NOTE — PROGRESS NOTES
"  Subjective  Jero Aguilar  is a 84 y.o. year old male who presents for HCM.   No dyspnea, no chest pain, no palpitations, no edemea    Blood pressure 130/80, pulse 85, height 1.702 m (5' 7\"), weight 73.5 kg (162 lb), SpO2 98%.   Patient has no known allergies.  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) 12/19/2022    Benign hypertensive kidney disease, stage V    Hypocalcemia     Hypocalcemia    Male erectile dysfunction, unspecified     Inability to attain erection    Neck pain 02/13/2023    Other conditions influencing health status 02/19/2013    Familial Combined Hyperlipidemia    Pain in unspecified thigh     Thigh pain    Personal history of diseases of the blood and blood-forming organs and certain disorders involving the immune mechanism     History of anemia    Personal history of other diseases of the circulatory system 01/28/2019    History of aortic valve insufficiency    Personal history of other diseases of the circulatory system     History of sinus bradycardia    Personal history of other diseases of the musculoskeletal system and connective tissue     History of low back pain    Personal history of other diseases of the nervous system and sense organs     History of deafness    Second degree sunburn 02/13/2023    Unspecified osteoarthritis, unspecified site 03/24/2016    Osteoarthritis, localized    Vitamin D deficiency, unspecified 04/16/2018    Vitamin D deficiency     Past Surgical History:   Procedure Laterality Date    BACK SURGERY  04/10/2013    Back Surgery    COLONOSCOPY  02/26/2013    Complete Colonoscopy    CYSTOSCOPY  02/26/2013    Diagnostic " Cystoscopy    ESOPHAGOGASTRODUODENOSCOPY  02/26/2013    Diagnostic Esophagogastroduodenoscopy    OTHER SURGICAL HISTORY  02/26/2013    Costovertebral Thoracic Decompression     No family history on file.  @[facility]    Current Outpatient Medications   Medication Sig Dispense Refill    cranberry conc-ascorbic acid 140-100 mg capsule 1 caps, ORAL, DAILY, Refill(s) 0, Date: 07/06/2021 12:57:00 EDT      furosemide (Lasix) 40 mg tablet Take 1 tablet (40 mg) by mouth once daily. 90 tablet 1    Gemtesa 75 mg tablet Take 1 tablet (75 mg) by mouth once daily.      IRON, FERROUS SULFATE, ORAL Date: 05/02/2023 12:56:00 EDT      mirtazapine (Remeron) 7.5 mg tablet Take 1 tablet (7.5 mg) by mouth once daily at bedtime. 30 tablet 5    multivit-min/ferrous fumarate (MULTI VITAMIN ORAL) Take by mouth.      omega 3-dha-epa-fish oil 1,200 (144-216) mg capsule Take 1,200 capsules (1,440,000 mg) by mouth once daily.      oxybutynin (Ditropan) 5 mg tablet Take 1 tablet (5 mg) by mouth 2 times a day.      pantoprazole (ProtoNix) 40 mg EC tablet Take 1 tablet (40 mg) by mouth once daily. Do not crush, chew, or split. 90 tablet 1    potassium chloride CR 20 mEq ER tablet Take 1 tablet (20 mEq) by mouth once daily. Do not crush, chew, or split. 90 tablet 1    simvastatin (Zocor) 40 mg tablet Take 1 tablet (40 mg) by mouth once daily at bedtime. 90 tablet 1     No current facility-administered medications for this visit.        ROS  Review of Systems   All other systems reviewed and are negative.      Physical Exam  Physical Exam  HENT:      Head: Normocephalic and atraumatic.   Cardiovascular:      Rate and Rhythm: Normal rate and regular rhythm.   Pulmonary:      Effort: Pulmonary effort is normal.      Breath sounds: Normal breath sounds.   Skin:     General: Skin is warm and dry.   Neurological:      General: No focal deficit present.      Mental Status: He is oriented to person, place, and time.   Psychiatric:         Mood and Affect: Mood  normal.         Behavior: Behavior normal.          EKG  Encounter Date: 10/23/24   ECG 12 Lead   Result Value    Ventricular Rate 89    QRS Duration 80    QT Interval 394    QTC Calculation(Bazett) 479    R Axis -54    T Axis 87    QRS Count 15    Q Onset 220    T Offset 417    QTC Fredericia 449    Narrative    Atrial fibrillation  Left axis deviation  Low voltage QRS  Septal infarct , age undetermined  Possible Lateral infarct , age undetermined  Inferior infarct , age undetermined  Abnormal ECG  No previous ECGs available       Problem List Items Addressed This Visit       Mild aortic insufficiency     10/23/24 echocardiogram mild to moderate AI (reviewed today)         Relevant Orders    Transthoracic Echo (TTE) Complete    Primary hypertrophic cardiomyopathy (Multi)     10/23/24 echocardiogram moderate to severe concentric LVH with LVEF = 75-80% with Valsalva LVOT gradient 24 (reviewed today)         Relevant Orders    Transthoracic Echo (TTE) Complete    Follow Up In Cardiology    Nonrheumatic mitral valve regurgitation    Relevant Orders    Transthoracic Echo (TTE) Complete    Follow Up In Cardiology     Other Visit Diagnoses       Atrial fibrillation, unspecified type (Multi)    -  Primary    Relevant Orders    ECG 12 Lead (Completed)    Follow Up In Cardiology              Return 6 months with EKG and echocadiogram a few days prior to return      Mehrdad Nicholson MD

## 2024-11-05 ENCOUNTER — OFFICE VISIT (OUTPATIENT)
Dept: PRIMARY CARE | Facility: CLINIC | Age: 84
End: 2024-11-05
Payer: MEDICARE

## 2024-11-05 VITALS
HEART RATE: 85 BPM | BODY MASS INDEX: 25.84 KG/M2 | WEIGHT: 165 LBS | DIASTOLIC BLOOD PRESSURE: 76 MMHG | SYSTOLIC BLOOD PRESSURE: 121 MMHG | RESPIRATION RATE: 20 BRPM | TEMPERATURE: 97.8 F | OXYGEN SATURATION: 96 %

## 2024-11-05 DIAGNOSIS — I10 HYPERTENSION, UNSPECIFIED TYPE: ICD-10-CM

## 2024-11-05 DIAGNOSIS — R60.9 EDEMA, UNSPECIFIED TYPE: ICD-10-CM

## 2024-11-05 DIAGNOSIS — I70.0 ATHEROSCLEROSIS OF AORTA (CMS-HCC): Primary | ICD-10-CM

## 2024-11-05 DIAGNOSIS — R29.898 BILATERAL LEG WEAKNESS: ICD-10-CM

## 2024-11-05 DIAGNOSIS — Z23 ENCOUNTER FOR IMMUNIZATION: ICD-10-CM

## 2024-11-05 DIAGNOSIS — R41.3 MEMORY LOSS: ICD-10-CM

## 2024-11-05 DIAGNOSIS — R26.2 DIFFICULTY IN WALKING, NOT ELSEWHERE CLASSIFIED: ICD-10-CM

## 2024-11-05 DIAGNOSIS — C61 ADENOCARCINOMA OF PROSTATE (MULTI): ICD-10-CM

## 2024-11-05 DIAGNOSIS — M15.9 GENERALIZED OSTEOARTHRITIS OF MULTIPLE SITES: ICD-10-CM

## 2024-11-05 DIAGNOSIS — K21.9 GASTROESOPHAGEAL REFLUX DISEASE WITHOUT ESOPHAGITIS: ICD-10-CM

## 2024-11-05 PROCEDURE — G0008 ADMIN INFLUENZA VIRUS VAC: HCPCS | Performed by: FAMILY MEDICINE

## 2024-11-05 PROCEDURE — 99214 OFFICE O/P EST MOD 30 MIN: CPT | Performed by: FAMILY MEDICINE

## 2024-11-05 PROCEDURE — 90662 IIV NO PRSV INCREASED AG IM: CPT | Performed by: FAMILY MEDICINE

## 2024-11-05 RX ORDER — FUROSEMIDE 40 MG/1
40 TABLET ORAL DAILY
Qty: 90 TABLET | Refills: 1 | Status: SHIPPED | OUTPATIENT
Start: 2024-11-05 | End: 2025-05-04

## 2024-11-05 NOTE — PROGRESS NOTES
Subjective   Patient ID: Jero Aguilar is a 84 y.o. male who presents for Follow-up (6 mo med fu/).    HPI  Patient presents today with his wife Katie for 5-month checkup and refill of meds. He currently is without complaints other than weakness in his lower extremities especially on the right which causes him to have difficulty with ambulation.  Patient has had this thoroughly evaluated in the past back in April 2022.  He had 8 visits to physical therapy and had consultation with Dr. Lambert and had an EMG with Dr. Madera.  He has previous history of surgery to the lumbar spine by Dr. Perez back in 2012.  He had a CT scan of the thoracic and lumbar spine at [facility] in December 2023 which showed  scoliosis most pronounced in the lumbar region with a levocurvature. No acute fracture or traumatic malalignment. No aggressive osseous lesions. Minimal grade I anterolisthesis of T2 and T3 Grade 1 anterolisthesis of L3 on L4 secondary to facet arthropathy. Postsurgical changes with posterior fusion hardware across the L4-5 levels with disc construct. Mild to moderate spondylosis with estimated mild to moderate ventral canal narrowing at T8-T9 secondary to a calcified posterior disc osteophyte complex.   Visible sacrum and pelvis: No acute abnormality.     He states that he is taking his medicines as directed and is not having any side effects from them.  He has gained 3 pounds from last month and is up 7 pounds from earlier in the year.  His wife feels that he is stable other than the weakness in his right lower extremity.    6/14/2024  Patient comes in today with his wife Katie for Medicare wellness exam.  She reports that patient is constipated at times.  When asked further the patient states his bowels are normal it is just the time in between movements.  He has been using MiraLAX and that is successful they are just questioning how often they can use it.    Patient also admits that he is not eating.  He just does not have  much of an appetite.  He has however regained weight going from 143 in March to now 159 today.  This is apparently good weight because there is no evidence of leg swelling in either extremity.    Patient's wife also reports that his memory loss seems to be slowly getting worse.  Having said that she does state that he does do crossword puzzles and the other day played Jeffrey with the family.  She also has him doing coloring books and other activities to try and keep his brain active.    Patient's blood pressure continues to remain good off the metoprolol medicine.  He was quite hypotensive when on the medication and seems to be doing well without it.  Will continue to monitor.    Patient's PHQ-9 score is 7 which his wife describes as somewhat difficult    Review of Systems   All other systems reviewed and are negative.      Objective   Vitals:  /76   Pulse 85   Temp 36.6 °C (97.8 °F)   Resp 20   Wt 74.8 kg (165 lb)   SpO2 96%   BMI 25.84 kg/m²     Physical Exam  Vitals reviewed.   Constitutional:       Appearance: He is well-developed.      Comments: Patient is in wheelchair for transport he does apparently do limited walking at home with a rollator.   HENT:      Head: Normocephalic and atraumatic.      Right Ear: Tympanic membrane, ear canal and external ear normal.      Left Ear: Tympanic membrane, ear canal and external ear normal.      Nose: Nose normal.      Mouth/Throat:      Mouth: Mucous membranes are moist.      Pharynx: Oropharynx is clear.   Eyes:      Extraocular Movements: Extraocular movements intact.      Conjunctiva/sclera: Conjunctivae normal.      Pupils: Pupils are equal, round, and reactive to light.      Comments: Patient wears glasses   Cardiovascular:      Rate and Rhythm: Normal rate and regular rhythm.      Heart sounds: Normal heart sounds. No murmur heard.  Pulmonary:      Effort: Pulmonary effort is normal.      Breath sounds: Normal breath sounds. No wheezing.   Abdominal:       General: Abdomen is flat. Bowel sounds are normal.      Palpations: Abdomen is soft.   Musculoskeletal:         General: Normal range of motion.   Skin:     General: Skin is warm and dry.   Neurological:      General: No focal deficit present.      Mental Status: He is alert and oriented to person, place, and time. Mental status is at baseline.      Motor: Weakness (Bilateral lower extremities right greater than left.) present.   Psychiatric:         Mood and Affect: Mood normal.         Behavior: Behavior normal.         Thought Content: Thought content normal.         Judgment: Judgment normal.       Assessment/Plan   Problem List Items Addressed This Visit       Adenocarcinoma of prostate (Multi)    Edema    Overview     Continue the Lasix 40 mg daily         Relevant Medications    furosemide (Lasix) 40 mg tablet    Generalized osteoarthritis of multiple sites    Bilateral leg weakness    Difficulty in walking, not elsewhere classified    Memory loss    Gastroesophageal reflux disease    Atherosclerosis of aorta (CMS-HCC) - Primary     Other Visit Diagnoses       Encounter for immunization        Relevant Orders    Flu vaccine, trivalent, preservative free, HIGH-DOSE, age 65y+ (Fluzone) (Completed)    Hypertension, unspecified type            Continue follow-up with multiple specialists as scheduled  Continue current meds as directed.  Follow up in 3 months for recheck if all remains stable, sooner if problems arise.  Medication list reconciled.  Thank you for visiting today!      Professional services: Some of this note was completed using Dragon voice technology and sometimes the software misinterprets words. This may include unintended errors with respect to translation of words, typographical errors or grammar errors which may not have been identified prior to finalization of the chart note. Please take this into account when reading the note. Thank you.       Dameon Iniguez DO 12/22/24 10:56 AM

## 2024-11-26 DIAGNOSIS — F32.A DEPRESSION, UNSPECIFIED DEPRESSION TYPE: ICD-10-CM

## 2024-11-26 DIAGNOSIS — R63.4 EXCESSIVE WEIGHT LOSS: ICD-10-CM

## 2024-11-26 NOTE — TELEPHONE ENCOUNTER
Rx Refill Request Telephone Encounter    Name:  Jero Aguilar  :  [id number]  Medication Name:  mirtazapine (Remeron) 7.5 mg tablet         Specific Pharmacy location:    [id number] [address] [facility] Phone: [phone]   Fax: [phone]        Date of last appointment:  24  Date of next appointment:    Best number to reach patient:  [phone]        Patients wife states that he only has one pill left.  Patient can be reached at [phone].  Thank You

## 2024-11-26 NOTE — TELEPHONE ENCOUNTER
Requested Prescriptions     Pending Prescriptions Disp Refills    mirtazapine (Remeron) 7.5 mg tablet 30 tablet 0     Sig: Take 1 tablet (7.5 mg) by mouth once daily at bedtime.

## 2024-11-27 RX ORDER — MIRTAZAPINE 7.5 MG/1
7.5 TABLET, FILM COATED ORAL NIGHTLY
Qty: 30 TABLET | Refills: 3 | Status: SHIPPED | OUTPATIENT
Start: 2024-11-27 | End: 2025-03-27

## 2024-12-06 DIAGNOSIS — E78.5 HYPERLIPIDEMIA, UNSPECIFIED HYPERLIPIDEMIA TYPE: ICD-10-CM

## 2024-12-06 RX ORDER — SIMVASTATIN 40 MG/1
40 TABLET, FILM COATED ORAL NIGHTLY
Qty: 90 TABLET | Refills: 1 | Status: SHIPPED | OUTPATIENT
Start: 2024-12-06 | End: 2025-06-04

## 2024-12-06 NOTE — TELEPHONE ENCOUNTER
Pt last OV 11/5/2024    Requested Prescriptions     Pending Prescriptions Disp Refills    simvastatin (Zocor) 40 mg tablet [Pharmacy Med Name: Simvastatin Oral Tablet 40 MG] 90 tablet 1     Sig: Take 1 tablet (40 mg) by mouth once daily at bedtime.

## 2025-01-04 DIAGNOSIS — K21.9 GASTROESOPHAGEAL REFLUX DISEASE WITHOUT ESOPHAGITIS: ICD-10-CM

## 2025-01-06 RX ORDER — PANTOPRAZOLE SODIUM 40 MG/1
40 TABLET, DELAYED RELEASE ORAL DAILY
Qty: 90 TABLET | Refills: 0 | Status: SHIPPED | OUTPATIENT
Start: 2025-01-06

## 2025-01-06 NOTE — TELEPHONE ENCOUNTER
Requested Prescriptions     Pending Prescriptions Disp Refills    pantoprazole (ProtoNix) 40 mg EC tablet [Pharmacy Med Name: Pantoprazole Sodium Oral Tablet Delayed Release 40 MG] 90 tablet 0     Sig: TAKE ONE TABLET BY MOUTH DAILY. DO NOT CRUSH, CHEW OR SPLIT.

## 2025-01-06 NOTE — TELEPHONE ENCOUNTER
Patient also called in this morning to request this medication I also scheduled an appointment for him in January.        Thank You

## 2025-01-13 DIAGNOSIS — E87.6 HYPOKALEMIA: ICD-10-CM

## 2025-01-13 RX ORDER — POTASSIUM CHLORIDE 20 MEQ/1
20 TABLET, EXTENDED RELEASE ORAL DAILY
Qty: 90 TABLET | Refills: 1 | Status: SHIPPED | OUTPATIENT
Start: 2025-01-13 | End: 2025-07-12

## 2025-01-13 NOTE — TELEPHONE ENCOUNTER
Rx Refill Request Telephone Encounter    Name:  Jero Aguilar  :  [id number]  Medication Name:  potassium chloride CR 20 mEq ER tablet     Specific Pharmacy location:    [id number] [address] [place] [address] Phone: [phone]   Fax: [phone]        Date of last appointment:  24  Date of next appointment:  25  Best number to reach patient:  [phone]          Thank You

## 2025-01-21 ENCOUNTER — TELEPHONE (OUTPATIENT)
Dept: PRIMARY CARE | Facility: CLINIC | Age: 85
End: 2025-01-21

## 2025-01-21 ENCOUNTER — APPOINTMENT (OUTPATIENT)
Dept: PRIMARY CARE | Facility: CLINIC | Age: 85
End: 2025-01-21
Payer: MEDICARE

## 2025-01-21 NOTE — TELEPHONE ENCOUNTER
Pt wife called to request refill on Oxybutnin. Chart shows med has not been filled in some time and wanted to confirm with pt that is the correct medication that needs refilled. LMTCB

## 2025-01-28 ENCOUNTER — APPOINTMENT (OUTPATIENT)
Dept: PRIMARY CARE | Facility: CLINIC | Age: 85
End: 2025-01-28
Payer: MEDICARE

## 2025-01-28 VITALS
WEIGHT: 166 LBS | HEART RATE: 86 BPM | TEMPERATURE: 97.5 F | RESPIRATION RATE: 20 BRPM | DIASTOLIC BLOOD PRESSURE: 70 MMHG | OXYGEN SATURATION: 97 % | SYSTOLIC BLOOD PRESSURE: 125 MMHG | BODY MASS INDEX: 26 KG/M2

## 2025-01-28 DIAGNOSIS — Z00.00 ROUTINE GENERAL MEDICAL EXAMINATION AT HEALTH CARE FACILITY: Primary | ICD-10-CM

## 2025-01-28 DIAGNOSIS — C61 ADENOCARCINOMA OF PROSTATE (MULTI): ICD-10-CM

## 2025-01-28 DIAGNOSIS — I42.9 CARDIOMYOPATHY, UNSPECIFIED TYPE (MULTI): ICD-10-CM

## 2025-01-28 DIAGNOSIS — N18.5 CHRONIC KIDNEY DISEASE, STAGE 5 (MULTI): ICD-10-CM

## 2025-01-28 DIAGNOSIS — E55.9 VITAMIN D DEFICIENCY: ICD-10-CM

## 2025-01-28 DIAGNOSIS — I48.91 ATRIAL FIBRILLATION, UNSPECIFIED TYPE (MULTI): ICD-10-CM

## 2025-01-28 DIAGNOSIS — F02.80 ALZHEIMER'S DEMENTIA, UNSPECIFIED DEMENTIA SEVERITY, UNSPECIFIED TIMING OF DEMENTIA ONSET, UNSPECIFIED WHETHER BEHAVIORAL, PSYCHOTIC, OR MOOD DISTURBANCE OR ANXIETY (MULTI): ICD-10-CM

## 2025-01-28 DIAGNOSIS — E53.8 VITAMIN B 12 DEFICIENCY: ICD-10-CM

## 2025-01-28 DIAGNOSIS — E78.2 HYPERLIPIDEMIA, MIXED: ICD-10-CM

## 2025-01-28 DIAGNOSIS — G30.9 ALZHEIMER'S DEMENTIA, UNSPECIFIED DEMENTIA SEVERITY, UNSPECIFIED TIMING OF DEMENTIA ONSET, UNSPECIFIED WHETHER BEHAVIORAL, PSYCHOTIC, OR MOOD DISTURBANCE OR ANXIETY (MULTI): ICD-10-CM

## 2025-01-28 PROCEDURE — 1159F MED LIST DOCD IN RCRD: CPT | Performed by: FAMILY MEDICINE

## 2025-01-28 PROCEDURE — 3078F DIAST BP <80 MM HG: CPT | Performed by: FAMILY MEDICINE

## 2025-01-28 PROCEDURE — 1123F ACP DISCUSS/DSCN MKR DOCD: CPT | Performed by: FAMILY MEDICINE

## 2025-01-28 PROCEDURE — 3074F SYST BP LT 130 MM HG: CPT | Performed by: FAMILY MEDICINE

## 2025-01-28 PROCEDURE — G0439 PPPS, SUBSEQ VISIT: HCPCS | Performed by: FAMILY MEDICINE

## 2025-01-28 PROCEDURE — 1158F ADVNC CARE PLAN TLK DOCD: CPT | Performed by: FAMILY MEDICINE

## 2025-01-28 PROCEDURE — 99397 PER PM REEVAL EST PAT 65+ YR: CPT | Performed by: FAMILY MEDICINE

## 2025-01-28 PROCEDURE — 1170F FXNL STATUS ASSESSED: CPT | Performed by: FAMILY MEDICINE

## 2025-01-28 ASSESSMENT — PATIENT HEALTH QUESTIONNAIRE - PHQ9
10. IF YOU CHECKED OFF ANY PROBLEMS, HOW DIFFICULT HAVE THESE PROBLEMS MADE IT FOR YOU TO DO YOUR WORK, TAKE CARE OF THINGS AT HOME, OR GET ALONG WITH OTHER PEOPLE: SOMEWHAT DIFFICULT
2. FEELING DOWN, DEPRESSED OR HOPELESS: SEVERAL DAYS
SUM OF ALL RESPONSES TO PHQ9 QUESTIONS 1 AND 2: 0
1. LITTLE INTEREST OR PLEASURE IN DOING THINGS: NOT AT ALL
SUM OF ALL RESPONSES TO PHQ9 QUESTIONS 1 AND 2: 2
2. FEELING DOWN, DEPRESSED OR HOPELESS: NOT AT ALL
1. LITTLE INTEREST OR PLEASURE IN DOING THINGS: SEVERAL DAYS

## 2025-01-28 ASSESSMENT — ACTIVITIES OF DAILY LIVING (ADL)
BATHING: INDEPENDENT
MANAGING_FINANCES: INDEPENDENT
DOING_HOUSEWORK: INDEPENDENT
GROCERY_SHOPPING: INDEPENDENT
TAKING_MEDICATION: INDEPENDENT
DRESSING: NEEDS ASSISTANCE

## 2025-01-28 ASSESSMENT — ENCOUNTER SYMPTOMS
LOSS OF SENSATION IN FEET: 1
DEPRESSION: 1
OCCASIONAL FEELINGS OF UNSTEADINESS: 1

## 2025-01-28 NOTE — PROGRESS NOTES
Subjective   Reason for Visit: Jero Aguilar is an 84 y.o. male here for a Medicare Wellness visit.     Past Medical, Surgical, and Family History reviewed and updated in chart.    Reviewed all medications by prescribing practitioner or clinical pharmacist (such as prescriptions, OTCs, herbal therapies and supplements) and documented in the medical record.    HPI  Patient comes in today with his wife Katie for Medicare wellness exam.  Patient suffers from mild dementia and memory loss but according to his wife he is eating well and his weight has remained pretty much the same.  She states that he does not move much sitting around in the chair most of the day.  He also sleeps a lot during the day.  She states she constantly has to remind him to do things.  He has had physical therapy out to the house and they have not been real helpful.    Patient reports score of 9 on his PHQ-9 however is questionable how truly accurate that is as he answered it himself.  He is currently on an antidepressant medication and his wife states he sleeps well through the night and that to her he does not seem particularly depressed to need a change in his antidepressant dose.    Patient Care Team:  Dameon Iniguez DO as PCP - General  Hernesto Almeida DO as PCP - [employer] PCP  Mehrdad Nicholson MD as Consulting Physician (Cardiology)     Review of Systems   All other systems reviewed and are negative.      Objective   Vitals:  /70   Pulse 86   Temp 36.4 °C (97.5 °F)   Resp 20   Wt 75.3 kg (166 lb)   SpO2 97%   BMI 26.00 kg/m²       Physical Exam  Vitals reviewed.   Constitutional:       Appearance: He is well-developed.      Comments: Patient is in wheelchair for transport he does apparently do limited walking at home with a rollator.   HENT:      Head: Normocephalic and atraumatic.      Right Ear: Tympanic membrane, ear canal and external ear normal.      Left Ear: Tympanic membrane, ear canal and external ear  normal.      Nose: Nose normal.      Mouth/Throat:      Mouth: Mucous membranes are moist.      Pharynx: Oropharynx is clear.   Eyes:      Extraocular Movements: Extraocular movements intact.      Conjunctiva/sclera: Conjunctivae normal.      Pupils: Pupils are equal, round, and reactive to light.      Comments: Patient wears glasses   Cardiovascular:      Rate and Rhythm: Normal rate and regular rhythm.      Heart sounds: Normal heart sounds. No murmur heard.  Pulmonary:      Effort: Pulmonary effort is normal.      Breath sounds: Normal breath sounds. No wheezing.   Abdominal:      General: Abdomen is flat. Bowel sounds are normal.      Palpations: Abdomen is soft.   Musculoskeletal:         General: Normal range of motion.   Skin:     General: Skin is warm and dry.   Neurological:      General: No focal deficit present.      Mental Status: He is alert. Mental status is at baseline. He is disoriented.      Motor: Weakness (Bilateral lower extremities right greater than left.) present.   Psychiatric:         Mood and Affect: Mood normal.         Behavior: Behavior normal.         Assessment & Plan  Chronic kidney disease, stage 5 (Multi)    Orders:    Comprehensive Metabolic Panel; Future    Alzheimer's dementia, unspecified dementia severity, unspecified timing of dementia onset, unspecified whether behavioral, psychotic, or mood disturbance or anxiety (Multi)         Cardiomyopathy, unspecified type (Multi)         Adenocarcinoma of prostate (Multi)    Orders:    Prostate Specific Antigen; Future    Atrial fibrillation, unspecified type (Multi)         Vitamin B 12 deficiency    Orders:    CBC; Future    Vitamin B12; Future    Vitamin D deficiency    Orders:    Vitamin D 25-Hydroxy,Total (for eval of Vitamin D levels); Future    Hyperlipidemia, mixed         Routine general medical examination at health care facility    Orders:    1 Year Follow Up In [facility] - PCP - Wellness Exam; Future    Will  contact patient with lab results when available.  Continue current meds as directed.  Follow up in 6 months for recheck if all remains stable, sooner if problems arise.  Medication list reconciled.  Thank you for visiting today!      Professional services: Some of this note was completed using Dragon voice technology and sometimes the software misinterprets words. This may include unintended errors with respect to translation of words, typographical errors or grammar errors which may not have been identified prior to finalization of the chart note. Please take this into account when reading the note. Thank you.

## 2025-01-29 PROBLEM — E78.2 HYPERLIPIDEMIA, MIXED: Status: ACTIVE | Noted: 2023-10-22

## 2025-01-29 LAB
25(OH)D3+25(OH)D2 SERPL-MCNC: 59 NG/ML (ref 30–100)
ALBUMIN SERPL-MCNC: 3.9 G/DL (ref 3.6–5.1)
ALP SERPL-CCNC: 61 U/L (ref 35–144)
ALT SERPL-CCNC: 18 U/L (ref 9–46)
ANION GAP SERPL CALCULATED.4IONS-SCNC: 10 MMOL/L (CALC) (ref 7–17)
AST SERPL-CCNC: 21 U/L (ref 10–35)
BILIRUB SERPL-MCNC: 0.6 MG/DL (ref 0.2–1.2)
BUN SERPL-MCNC: 39 MG/DL (ref 7–25)
CALCIUM SERPL-MCNC: 8.9 MG/DL (ref 8.6–10.3)
CHLORIDE SERPL-SCNC: 107 MMOL/L (ref 98–110)
CO2 SERPL-SCNC: 24 MMOL/L (ref 20–32)
CREAT SERPL-MCNC: 2.21 MG/DL (ref 0.7–1.22)
EGFRCR SERPLBLD CKD-EPI 2021: 28 ML/MIN/1.73M2
ERYTHROCYTE [DISTWIDTH] IN BLOOD BY AUTOMATED COUNT: 14.5 % (ref 11–15)
GLUCOSE SERPL-MCNC: 108 MG/DL (ref 65–139)
HCT VFR BLD AUTO: 37.9 % (ref 38.5–50)
HGB BLD-MCNC: 12.1 G/DL (ref 13.2–17.1)
MCH RBC QN AUTO: 30.2 PG (ref 27–33)
MCHC RBC AUTO-ENTMCNC: 31.9 G/DL (ref 32–36)
MCV RBC AUTO: 94.5 FL (ref 80–100)
PLATELET # BLD AUTO: 141 THOUSAND/UL (ref 140–400)
PMV BLD REES-ECKER: 10.3 FL (ref 7.5–12.5)
POTASSIUM SERPL-SCNC: 4.1 MMOL/L (ref 3.5–5.3)
PROT SERPL-MCNC: 6.3 G/DL (ref 6.1–8.1)
PSA SERPL-MCNC: 7.29 NG/ML
RBC # BLD AUTO: 4.01 MILLION/UL (ref 4.2–5.8)
SODIUM SERPL-SCNC: 141 MMOL/L (ref 135–146)
VIT B12 SERPL-MCNC: 479 PG/ML (ref 200–1100)
WBC # BLD AUTO: 5.4 THOUSAND/UL (ref 3.8–10.8)

## 2025-01-31 ENCOUNTER — TELEPHONE (OUTPATIENT)
Dept: PRIMARY CARE | Facility: CLINIC | Age: 85
End: 2025-01-31
Payer: MEDICARE

## 2025-01-31 NOTE — TELEPHONE ENCOUNTER
----- Message from Dameon Iniguez sent at 1/31/2025  7:22 AM EST -----  Please notify that he is slightly anemic with his hemoglobin at 12.1 which is about the same as it was 7 months ago.  We will continue to monitor.    His creatinine is at 2.21 and GFR 28 which is CKD stage 4 which is slightly improved from 7 months ago and is stable.  Recommend that he drink 32 ounces of water a day.    His PSA is 7.29 which is down from 9.46  7 months ago    His B12 and D levels are good.

## 2025-01-31 NOTE — RESULT ENCOUNTER NOTE
Please notify that he is slightly anemic with his hemoglobin at 12.1 which is about the same as it was 7 months ago.  We will continue to monitor.    His creatinine is at 2.21 and GFR 28 which is CKD stage 4 which is slightly improved from 7 months ago and is stable.  Recommend that he drink 32 ounces of water a day.    His PSA is 7.29 which is down from 9.46  7 months ago    His B12 and D levels are good.

## 2025-03-25 ENCOUNTER — TELEPHONE (OUTPATIENT)
Dept: PRIMARY CARE | Facility: CLINIC | Age: 85
End: 2025-03-25
Payer: MEDICARE

## 2025-03-25 DIAGNOSIS — F32.A DEPRESSION, UNSPECIFIED DEPRESSION TYPE: ICD-10-CM

## 2025-03-25 DIAGNOSIS — R63.4 EXCESSIVE WEIGHT LOSS: ICD-10-CM

## 2025-03-25 RX ORDER — MIRTAZAPINE 7.5 MG/1
7.5 TABLET, FILM COATED ORAL NIGHTLY
Qty: 90 TABLET | Refills: 0 | Status: SHIPPED | OUTPATIENT
Start: 2025-03-25 | End: 2025-06-23

## 2025-03-25 NOTE — TELEPHONE ENCOUNTER
Rx Refill Request Telephone Encounter    Name:  Jero Aguilar  :  [id number]  Medication Name:  mirtazapine (Remeron) 7.5 mg tablet     Specific Pharmacy location:    [id number] [address] [facility] Phone: [phone]   Fax: [phone]        Date of last appointment:  25  Date of next appointment:  25  Best number to reach patient:  [phone]        Thank You

## 2025-04-28 ENCOUNTER — APPOINTMENT (OUTPATIENT)
Dept: CARDIOLOGY | Facility: CLINIC | Age: 85
End: 2025-04-28
Payer: MEDICARE

## 2025-05-01 ENCOUNTER — APPOINTMENT (OUTPATIENT)
Dept: CARDIOLOGY | Facility: CLINIC | Age: 85
End: 2025-05-01
Payer: MEDICARE

## 2025-05-12 DIAGNOSIS — K21.9 GASTROESOPHAGEAL REFLUX DISEASE WITHOUT ESOPHAGITIS: ICD-10-CM

## 2025-05-12 RX ORDER — PANTOPRAZOLE SODIUM 40 MG/1
40 TABLET, DELAYED RELEASE ORAL DAILY
Qty: 90 TABLET | Refills: 0 | Status: SHIPPED | OUTPATIENT
Start: 2025-05-12

## 2025-05-12 NOTE — TELEPHONE ENCOUNTER
Rx Refill Request Telephone Encounter    Name:  Jero Aguilar  :  [id number]  Medication Name:  pantoprazole (ProtoNix) 40 mg EC tablet     Specific Pharmacy location:    [id number] [address] Phone: [phone]   Fax: [phone]        Date of last appointment:  25  Date of next appointment:  25  Best number to reach patient:  [phone]        Thank You

## 2025-05-12 NOTE — TELEPHONE ENCOUNTER
Requested Prescriptions     Pending Prescriptions Disp Refills    pantoprazole (ProtoNix) 40 mg EC tablet 90 tablet 0     Sig: Take 1 tablet (40 mg) by mouth once daily. Do not crush, chew, or split

## 2025-06-02 DIAGNOSIS — R60.9 EDEMA, UNSPECIFIED TYPE: ICD-10-CM

## 2025-06-02 RX ORDER — FUROSEMIDE 40 MG/1
40 TABLET ORAL DAILY
Qty: 60 TABLET | Refills: 0 | Status: SHIPPED | OUTPATIENT
Start: 2025-06-02 | End: 2025-08-01

## 2025-06-02 NOTE — TELEPHONE ENCOUNTER
Rx Refill Request Telephone Encounter    Name:  Jero Aguilar  :  [id number]  Medication Name:  furosemide (Lasix) 40 mg tablet     Specific Pharmacy location:    [id number] [address] Phone: [phone]   Fax: [phone]        Date of last appointment:  25  Date of next appointment:  25  Best number to reach patient:  [phone]      Thank You

## 2025-06-06 ENCOUNTER — TELEPHONE (OUTPATIENT)
Dept: PRIMARY CARE | Facility: CLINIC | Age: 85
End: 2025-06-06
Payer: MEDICARE

## 2025-06-06 DIAGNOSIS — R35.0 URINARY FREQUENCY: Primary | ICD-10-CM

## 2025-06-06 RX ORDER — OXYBUTYNIN CHLORIDE 5 MG/1
5 TABLET ORAL 2 TIMES DAILY
Qty: 60 TABLET | Refills: 3 | Status: SHIPPED | OUTPATIENT
Start: 2025-06-06

## 2025-06-27 ENCOUNTER — APPOINTMENT (OUTPATIENT)
Dept: CARDIOLOGY | Facility: CLINIC | Age: 85
End: 2025-06-27
Payer: MEDICARE

## 2025-07-08 ENCOUNTER — APPOINTMENT (OUTPATIENT)
Dept: CARDIOLOGY | Facility: CLINIC | Age: 85
End: 2025-07-08
Payer: MEDICARE

## 2025-07-09 ENCOUNTER — APPOINTMENT (OUTPATIENT)
Dept: PRIMARY CARE | Facility: CLINIC | Age: 85
End: 2025-07-09
Payer: MEDICARE

## 2025-07-09 VITALS
TEMPERATURE: 98.7 F | SYSTOLIC BLOOD PRESSURE: 118 MMHG | OXYGEN SATURATION: 96 % | BODY MASS INDEX: 25.22 KG/M2 | WEIGHT: 161 LBS | HEART RATE: 89 BPM | DIASTOLIC BLOOD PRESSURE: 74 MMHG | RESPIRATION RATE: 20 BRPM

## 2025-07-09 DIAGNOSIS — D64.9 ANEMIA, UNSPECIFIED TYPE: Primary | ICD-10-CM

## 2025-07-09 DIAGNOSIS — N18.5 CHRONIC KIDNEY DISEASE, STAGE 5 (MULTI): ICD-10-CM

## 2025-07-09 DIAGNOSIS — E78.2 HYPERLIPIDEMIA, MIXED: ICD-10-CM

## 2025-07-09 DIAGNOSIS — G30.9 ALZHEIMER'S DEMENTIA, UNSPECIFIED DEMENTIA SEVERITY, UNSPECIFIED TIMING OF DEMENTIA ONSET, UNSPECIFIED WHETHER BEHAVIORAL, PSYCHOTIC, OR MOOD DISTURBANCE OR ANXIETY (MULTI): ICD-10-CM

## 2025-07-09 DIAGNOSIS — R29.898 BILATERAL LEG WEAKNESS: ICD-10-CM

## 2025-07-09 DIAGNOSIS — C61 ADENOCARCINOMA OF PROSTATE (MULTI): ICD-10-CM

## 2025-07-09 DIAGNOSIS — R63.4 UNEXPLAINED WEIGHT LOSS: ICD-10-CM

## 2025-07-09 DIAGNOSIS — Z13.228 SCREENING FOR METABOLIC DISORDER: ICD-10-CM

## 2025-07-09 DIAGNOSIS — F02.80 ALZHEIMER'S DEMENTIA, UNSPECIFIED DEMENTIA SEVERITY, UNSPECIFIED TIMING OF DEMENTIA ONSET, UNSPECIFIED WHETHER BEHAVIORAL, PSYCHOTIC, OR MOOD DISTURBANCE OR ANXIETY (MULTI): ICD-10-CM

## 2025-07-09 DIAGNOSIS — R26.2 DIFFICULTY IN WALKING, NOT ELSEWHERE CLASSIFIED: ICD-10-CM

## 2025-07-09 DIAGNOSIS — K21.9 GASTROESOPHAGEAL REFLUX DISEASE WITHOUT ESOPHAGITIS: ICD-10-CM

## 2025-07-09 PROCEDURE — 99214 OFFICE O/P EST MOD 30 MIN: CPT | Performed by: FAMILY MEDICINE

## 2025-07-09 RX ORDER — SIMVASTATIN 40 MG/1
40 TABLET, FILM COATED ORAL NIGHTLY
Qty: 90 TABLET | Refills: 1 | Status: SHIPPED | OUTPATIENT
Start: 2025-07-09 | End: 2026-01-05

## 2025-07-09 ASSESSMENT — PATIENT HEALTH QUESTIONNAIRE - PHQ9
1. LITTLE INTEREST OR PLEASURE IN DOING THINGS: NOT AT ALL
SUM OF ALL RESPONSES TO PHQ9 QUESTIONS 1 AND 2: 0
2. FEELING DOWN, DEPRESSED OR HOPELESS: NOT AT ALL

## 2025-07-09 ASSESSMENT — ENCOUNTER SYMPTOMS
LOSS OF SENSATION IN FEET: 0
OCCASIONAL FEELINGS OF UNSTEADINESS: 0
DEPRESSION: 0

## 2025-07-09 NOTE — PROGRESS NOTES
Subjective   Patient ID: Jero Aguilar is a 84 y.o. male who presents for Follow-up (6 mo med fu. No questions, concerns, or complaints. /He has not been abl to walk and wife wants to know why. He did fall in April and broke an ankle and was in rehab but the difficulty walking was happening prior. /).    HPI  Patient presents today with his wife Katie for 6-month checkup and refill of meds.  She states that he is taking his medicines as directed and is not having any side effects from them. He currently is without complaints.    Patient is suffering from dementia.  He is forgetful and becoming less ambulatory and is not as active as his wife would like him to be.  His wife also is having some memory loss.  By our scales patient has lost 5 pounds since January but his weight has fluctuated over the last few years between 143 and 166 and he has been in the 160s since last October.    His wife is concerned about his lack of ambulation.  He does use a walker when walking short distances at home but we have had physical therapy out to his house in the past with little gain or improvement in his lower bilateral leg weakness.  Also the patient broke his right ankle back on 3/29/2025 and just recently completed the rehab for that through [facility].    1/28/2025  Patient comes in today with his wife Katie for Medicare wellness exam.  Patient suffers from mild dementia and memory loss but according to his wife he is eating well and his weight has remained pretty much the same.  She states that he does not move much sitting around in the chair most of the day.  He also sleeps a lot during the day.  She states she constantly has to remind him to do things.  He has had physical therapy out to the house and they have not been real helpful.    Patient reports score of 9 on his PHQ-9 however is questionable how truly accurate that is as he answered it himself.  He is currently on an antidepressant medication and his wife states he  sleeps well through the night and that to her he does not seem particularly depressed to need a change in his antidepressant dose.      Review of Systems   All other systems reviewed and are negative.      Objective   Vitals:  /74   Pulse 89   Temp 37.1 °C (98.7 °F)   Resp 20   Wt 73 kg (161 lb)   SpO2 96%   BMI 25.22 kg/m²     Physical Exam  Vitals reviewed.   Constitutional:       Appearance: He is well-developed.      Comments: Patient is in wheelchair for transport he does apparently do very limited walking at home with a rollator.   HENT:      Head: Normocephalic and atraumatic.      Right Ear: Tympanic membrane, ear canal and external ear normal.      Left Ear: Tympanic membrane, ear canal and external ear normal.      Nose: Nose normal.      Mouth/Throat:      Mouth: Mucous membranes are moist.      Pharynx: Oropharynx is clear.   Eyes:      Extraocular Movements: Extraocular movements intact.      Conjunctiva/sclera: Conjunctivae normal.      Pupils: Pupils are equal, round, and reactive to light.      Comments: Patient wears glasses   Cardiovascular:      Rate and Rhythm: Normal rate and regular rhythm.      Heart sounds: Normal heart sounds. No murmur heard.  Pulmonary:      Effort: Pulmonary effort is normal.      Breath sounds: Normal breath sounds. No wheezing.   Abdominal:      General: Abdomen is flat. Bowel sounds are normal.      Palpations: Abdomen is soft.   Musculoskeletal:         General: Normal range of motion.   Skin:     General: Skin is warm and dry.   Neurological:      General: No focal deficit present.      Mental Status: He is alert. Mental status is at baseline. He is disoriented.      Motor: Weakness (Bilateral lower extremities right greater than left.) present.   Psychiatric:         Mood and Affect: Mood normal.         Behavior: Behavior normal.         CBC -  Recent Labs     07/09/25  1540 01/28/25  1354 06/14/24  1421   WBC 5.9 5.4 5.5   HGB 12.0* 12.1* 12.7*   HCT  37.3* 37.9* 38.6*    141 150   MCV 92.8 94.5 92       CMP -  Recent Labs     07/09/25  1540 01/28/25  1354 06/14/24  1421 04/05/22  1446 03/28/22  1414    141 140   < > 141   K 4.3 4.1 4.6   < > 4.6    107 105   < > 110*   CO2 27 24 28   < > 24   ANIONGAP 9 10 12   < > 12   BUN 34* 39* 43*   < > 31*   CREATININE 2.00* 2.21* 2.43*   < > 1.75*   EGFR 32* 28* 25*   < >  --    MG  --   --   --   --  2.24    < > = values in this interval not displayed.     Recent Labs     07/09/25  1540 01/28/25  1354 06/14/24  1421   ALBUMIN 3.9 3.9 4.0   ALKPHOS 69 61 67   ALT 11 18 24   AST 16 21 24   BILITOT 0.7 0.6 0.7       LIPID PANEL -   Recent Labs     10/05/21  1048 09/30/20  1200 10/07/19  1042   CHOL 134 129 155   LDLF 53 51 68   HDL 61.0 60.0 57.8   TRIG 100 91 147       Recent Labs     04/05/22  1446 03/28/22  1414   * 393*       Lab Results   Component Value Date    QILZEDTZ59 479 01/28/2025       Lab Results   Component Value Date    VITD25 59 01/28/2025        Assessment/Plan   Problem List Items Addressed This Visit       Adenocarcinoma of prostate (Multi)    Chronic kidney disease, stage 5 (Multi)    Bilateral leg weakness    Dementia    Unexplained weight loss    Overview   Initially due to poor appetite now also due to Lasix         Difficulty in walking, not elsewhere classified    Hyperlipidemia, mixed    Relevant Medications    simvastatin (Zocor) 40 mg tablet    Gastroesophageal reflux disease    Screening for metabolic disorder    Relevant Orders    Comprehensive Metabolic Panel (Completed)    Anemia - Primary    Relevant Orders    CBC (Completed)   Will contact patient with lab results when available.     Patient exhibiting no signs of depression and does not need treatment at this time.  Medication list reconciled.  Thank you for visiting today!        Professional services: Some of this note was completed using Dragon voice technology and sometimes the software misinterprets words.  This may include unintended errors with respect to translation of words, typographical errors or grammar errors which may not have been identified prior to finalization of the chart note. Please take this into account when reading the note. Thank you.              Dameon Iniguez DO 07/13/25 12:59 PM

## 2025-07-10 LAB
ALBUMIN SERPL-MCNC: 3.9 G/DL (ref 3.6–5.1)
ALP SERPL-CCNC: 69 U/L (ref 35–144)
ALT SERPL-CCNC: 11 U/L (ref 9–46)
ANION GAP SERPL CALCULATED.4IONS-SCNC: 9 MMOL/L (CALC) (ref 7–17)
AST SERPL-CCNC: 16 U/L (ref 10–35)
BILIRUB SERPL-MCNC: 0.7 MG/DL (ref 0.2–1.2)
BUN SERPL-MCNC: 34 MG/DL (ref 7–25)
CALCIUM SERPL-MCNC: 9 MG/DL (ref 8.6–10.3)
CHLORIDE SERPL-SCNC: 105 MMOL/L (ref 98–110)
CO2 SERPL-SCNC: 27 MMOL/L (ref 20–32)
CREAT SERPL-MCNC: 2 MG/DL (ref 0.7–1.22)
EGFRCR SERPLBLD CKD-EPI 2021: 32 ML/MIN/1.73M2
ERYTHROCYTE [DISTWIDTH] IN BLOOD BY AUTOMATED COUNT: 14.9 % (ref 11–15)
GLUCOSE SERPL-MCNC: 96 MG/DL (ref 65–139)
HCT VFR BLD AUTO: 37.3 % (ref 38.5–50)
HGB BLD-MCNC: 12 G/DL (ref 13.2–17.1)
MCH RBC QN AUTO: 29.9 PG (ref 27–33)
MCHC RBC AUTO-ENTMCNC: 32.2 G/DL (ref 32–36)
MCV RBC AUTO: 92.8 FL (ref 80–100)
PLATELET # BLD AUTO: 172 THOUSAND/UL (ref 140–400)
PMV BLD REES-ECKER: 9.7 FL (ref 7.5–12.5)
POTASSIUM SERPL-SCNC: 4.3 MMOL/L (ref 3.5–5.3)
PROT SERPL-MCNC: 6.9 G/DL (ref 6.1–8.1)
RBC # BLD AUTO: 4.02 MILLION/UL (ref 4.2–5.8)
SODIUM SERPL-SCNC: 141 MMOL/L (ref 135–146)
WBC # BLD AUTO: 5.9 THOUSAND/UL (ref 3.8–10.8)

## 2025-07-13 PROBLEM — D64.9 ANEMIA: Status: ACTIVE | Noted: 2025-07-13

## 2025-07-13 PROBLEM — Z13.228 SCREENING FOR METABOLIC DISORDER: Status: ACTIVE | Noted: 2025-07-13

## 2025-07-15 LAB
NON-UH HIE ALB: 3.3 G/DL (ref 3.4–5)
NON-UH HIE BUN/CREAT RATIO: 15.5
NON-UH HIE BUN: 31 MG/DL (ref 9–23)
NON-UH HIE CALCIUM: 9.3 MG/DL (ref 8.7–10.4)
NON-UH HIE CALCULATED OSMOLALITY: 293 MOSM/KG (ref 275–295)
NON-UH HIE CHLORIDE: 107 MMOL/L (ref 98–107)
NON-UH HIE CO2, VENOUS: 27 MMOL/L (ref 20–31)
NON-UH HIE CORRECTED CALCIUM: 9.8 MG/DL (ref 8.5–10.5)
NON-UH HIE CREATININE: 2 MG/DL (ref 0.6–1.1)
NON-UH HIE GFR AA: 38
NON-UH HIE GLOMERULAR FILTRATION RATE: 32 ML/MIN/1.73M?
NON-UH HIE GLUCOSE: 89 MG/DL (ref 74–106)
NON-UH HIE HCT: 36.4 % (ref 41–52)
NON-UH HIE HGB: 12.3 G/DL (ref 13.5–17.5)
NON-UH HIE INSTR WBC ND: 5.9
NON-UH HIE K: 4 MMOL/L (ref 3.5–5.1)
NON-UH HIE MCH: 30.4 PG (ref 27–34)
NON-UH HIE MCHC: 33.6 G/DL (ref 32–37)
NON-UH HIE MCV: 90.3 FL (ref 80–100)
NON-UH HIE MPV: 7.8 FL (ref 7.4–10.4)
NON-UH HIE NA: 144 MMOL/L (ref 135–145)
NON-UH HIE PHOSPHORUS: 3.2 MG/DL (ref 2–5.1)
NON-UH HIE PLATELET: 145 X10 (ref 150–450)
NON-UH HIE RBC: 4.04 X10 (ref 4.7–6.1)
NON-UH HIE RDW: 16.4 % (ref 11.5–14.5)
NON-UH HIE VIT D 25: 44 NG/ML
NON-UH HIE WBC: 5.9 X10 (ref 4.5–11)

## 2025-07-16 ENCOUNTER — RESULTS FOLLOW-UP (OUTPATIENT)
Dept: PRIMARY CARE | Facility: CLINIC | Age: 85
End: 2025-07-16
Payer: MEDICARE

## 2025-07-16 NOTE — TELEPHONE ENCOUNTER
Pt was given results and verbalized understanding. She asks if Jero is still considered anemic or has that improved, please advise

## 2025-07-16 NOTE — TELEPHONE ENCOUNTER
----- Message from Dameon Iniguez sent at 7/16/2025  3:48 PM EDT -----  Please notify patient's wife Katie of slightly improved kidney function but otherwise stable labs.  Recheck lab work again in January 2026  ----- Message -----  From: [facility] Results In  Sent: 7/10/2025   7:38 AM EDT  To: Dameon Iniguez, DO

## 2025-07-25 ENCOUNTER — APPOINTMENT (OUTPATIENT)
Dept: PRIMARY CARE | Facility: CLINIC | Age: 85
End: 2025-07-25
Payer: MEDICARE

## 2025-07-26 DIAGNOSIS — F32.A DEPRESSION, UNSPECIFIED DEPRESSION TYPE: ICD-10-CM

## 2025-07-26 DIAGNOSIS — R63.4 EXCESSIVE WEIGHT LOSS: ICD-10-CM

## 2025-07-26 DIAGNOSIS — R60.9 EDEMA, UNSPECIFIED TYPE: ICD-10-CM

## 2025-07-28 RX ORDER — FUROSEMIDE 40 MG/1
40 TABLET ORAL DAILY
Qty: 60 TABLET | Refills: 0 | Status: SHIPPED | OUTPATIENT
Start: 2025-07-28

## 2025-07-28 RX ORDER — MIRTAZAPINE 7.5 MG/1
TABLET, FILM COATED ORAL
Qty: 90 TABLET | Refills: 0 | Status: SHIPPED | OUTPATIENT
Start: 2025-07-28

## 2025-07-28 NOTE — TELEPHONE ENCOUNTER
Patients wife Cyndi called in the office today also requesting these medications.  Cyndi can be reached at [phone].        Thank You

## 2025-07-28 NOTE — TELEPHONE ENCOUNTER
Pt last OV 7/9/2025  Next OV Visit date not found    Requested Prescriptions     Pending Prescriptions Disp Refills    mirtazapine (Remeron) 7.5 mg tablet [Pharmacy Med Name: Mirtazapine Oral Tablet 7.5 MG] 90 tablet 0     Sig: TAKE ONE TABLET (7.5 mg) BY MOUTH once daily AT BEDTIME    furosemide (Lasix) 40 mg tablet [Pharmacy Med Name: Furosemide Oral Tablet 40 MG] 60 tablet 0     Sig: TAKE ONE TABLET BY MOUTH once daily.

## 2025-08-11 DIAGNOSIS — K21.9 GASTROESOPHAGEAL REFLUX DISEASE WITHOUT ESOPHAGITIS: ICD-10-CM

## 2025-08-11 DIAGNOSIS — E87.6 HYPOKALEMIA: ICD-10-CM

## 2025-08-11 RX ORDER — PANTOPRAZOLE SODIUM 40 MG/1
40 TABLET, DELAYED RELEASE ORAL DAILY
Qty: 90 TABLET | Refills: 1 | Status: SHIPPED | OUTPATIENT
Start: 2025-08-11

## 2025-08-11 RX ORDER — POTASSIUM CHLORIDE 20 MEQ/1
20 TABLET, EXTENDED RELEASE ORAL DAILY
Qty: 90 TABLET | Refills: 1 | Status: SHIPPED | OUTPATIENT
Start: 2025-08-11 | End: 2026-02-07

## 2026-01-29 ENCOUNTER — APPOINTMENT (OUTPATIENT)
Dept: PRIMARY CARE | Facility: CLINIC | Age: 86
End: 2026-01-29
Payer: MEDICARE